# Patient Record
Sex: MALE | Race: WHITE | NOT HISPANIC OR LATINO | Employment: OTHER | URBAN - METROPOLITAN AREA
[De-identification: names, ages, dates, MRNs, and addresses within clinical notes are randomized per-mention and may not be internally consistent; named-entity substitution may affect disease eponyms.]

---

## 2024-07-08 ENCOUNTER — HOSPITAL ENCOUNTER (OUTPATIENT)
Facility: HOSPITAL | Age: 74
Setting detail: OBSERVATION
Discharge: HOME | End: 2024-07-10
Attending: INTERNAL MEDICINE | Admitting: INTERNAL MEDICINE
Payer: MEDICARE

## 2024-07-08 LAB
ALBUMIN SERPL-MCNC: 3.6 G/DL (ref 3.5–5.2)
ALBUMIN/GLOB SERPL: 1.4 G/DL
ALP SERPL-CCNC: 115 U/L (ref 39–117)
ALT SERPL W P-5'-P-CCNC: 25 U/L (ref 1–41)
ANION GAP SERPL CALCULATED.3IONS-SCNC: 10 MMOL/L (ref 5–15)
AST SERPL-CCNC: 30 U/L (ref 1–40)
BASOPHILS # BLD AUTO: 0.05 10*3/MM3 (ref 0–0.2)
BASOPHILS NFR BLD AUTO: 0.5 % (ref 0–1.5)
BILIRUB SERPL-MCNC: 0.3 MG/DL (ref 0–1.2)
BUN SERPL-MCNC: 21 MG/DL (ref 8–23)
BUN/CREAT SERPL: 22.6 (ref 7–25)
CALCIUM SPEC-SCNC: 9.1 MG/DL (ref 8.6–10.5)
CHLORIDE SERPL-SCNC: 97 MMOL/L (ref 98–107)
CO2 SERPL-SCNC: 25 MMOL/L (ref 22–29)
CREAT SERPL-MCNC: 0.93 MG/DL (ref 0.76–1.27)
CRP SERPL-MCNC: 4.73 MG/DL (ref 0–0.5)
DEPRECATED RDW RBC AUTO: 47.1 FL (ref 37–54)
EGFRCR SERPLBLD CKD-EPI 2021: 87.2 ML/MIN/1.73
EOSINOPHIL # BLD AUTO: 0.13 10*3/MM3 (ref 0–0.4)
EOSINOPHIL NFR BLD AUTO: 1.4 % (ref 0.3–6.2)
ERYTHROCYTE [DISTWIDTH] IN BLOOD BY AUTOMATED COUNT: 15 % (ref 12.3–15.4)
ERYTHROCYTE [SEDIMENTATION RATE] IN BLOOD: 28 MM/HR (ref 0–20)
GLOBULIN UR ELPH-MCNC: 2.6 GM/DL
GLUCOSE SERPL-MCNC: 88 MG/DL (ref 65–99)
HCT VFR BLD AUTO: 28.3 % (ref 37.5–51)
HGB BLD-MCNC: 9.4 G/DL (ref 13–17.7)
IMM GRANULOCYTES # BLD AUTO: 0.07 10*3/MM3 (ref 0–0.05)
IMM GRANULOCYTES NFR BLD AUTO: 0.7 % (ref 0–0.5)
LYMPHOCYTES # BLD AUTO: 0.71 10*3/MM3 (ref 0.7–3.1)
LYMPHOCYTES NFR BLD AUTO: 7.6 % (ref 19.6–45.3)
MCH RBC QN AUTO: 30.5 PG (ref 26.6–33)
MCHC RBC AUTO-ENTMCNC: 33.2 G/DL (ref 31.5–35.7)
MCV RBC AUTO: 91.9 FL (ref 79–97)
MONOCYTES # BLD AUTO: 1.09 10*3/MM3 (ref 0.1–0.9)
MONOCYTES NFR BLD AUTO: 11.6 % (ref 5–12)
NEUTROPHILS NFR BLD AUTO: 7.35 10*3/MM3 (ref 1.7–7)
NEUTROPHILS NFR BLD AUTO: 78.2 % (ref 42.7–76)
NRBC BLD AUTO-RTO: 0 /100 WBC (ref 0–0.2)
PLATELET # BLD AUTO: 383 10*3/MM3 (ref 140–450)
PMV BLD AUTO: 8.5 FL (ref 6–12)
POTASSIUM SERPL-SCNC: 4 MMOL/L (ref 3.5–5.2)
PROCALCITONIN SERPL-MCNC: 0.04 NG/ML (ref 0–0.25)
PROT SERPL-MCNC: 6.2 G/DL (ref 6–8.5)
RBC # BLD AUTO: 3.08 10*6/MM3 (ref 4.14–5.8)
SODIUM SERPL-SCNC: 132 MMOL/L (ref 136–145)
WBC NRBC COR # BLD AUTO: 9.4 10*3/MM3 (ref 3.4–10.8)

## 2024-07-08 PROCEDURE — G0378 HOSPITAL OBSERVATION PER HR: HCPCS

## 2024-07-08 PROCEDURE — 85025 COMPLETE CBC W/AUTO DIFF WBC: CPT | Performed by: INTERNAL MEDICINE

## 2024-07-08 PROCEDURE — 85652 RBC SED RATE AUTOMATED: CPT | Performed by: INTERNAL MEDICINE

## 2024-07-08 PROCEDURE — 84145 PROCALCITONIN (PCT): CPT | Performed by: INTERNAL MEDICINE

## 2024-07-08 PROCEDURE — 80053 COMPREHEN METABOLIC PANEL: CPT | Performed by: INTERNAL MEDICINE

## 2024-07-08 PROCEDURE — 86140 C-REACTIVE PROTEIN: CPT | Performed by: INTERNAL MEDICINE

## 2024-07-08 RX ORDER — TRAMADOL HYDROCHLORIDE 50 MG/1
100 TABLET ORAL 3 TIMES DAILY PRN
Status: DISCONTINUED | OUTPATIENT
Start: 2024-07-08 | End: 2024-07-09

## 2024-07-08 RX ORDER — NAPROXEN 500 MG/1
500 TABLET ORAL 2 TIMES DAILY WITH MEALS
Status: DISCONTINUED | OUTPATIENT
Start: 2024-07-09 | End: 2024-07-09

## 2024-07-08 RX ORDER — HYDROCODONE BITARTRATE AND ACETAMINOPHEN 5; 325 MG/1; MG/1
1 TABLET ORAL NIGHTLY
Status: DISCONTINUED | OUTPATIENT
Start: 2024-07-08 | End: 2024-07-09

## 2024-07-08 RX ADMIN — HYDROCODONE BITARTRATE AND ACETAMINOPHEN 1 TABLET: 5; 325 TABLET ORAL at 21:51

## 2024-07-09 ENCOUNTER — APPOINTMENT (OUTPATIENT)
Dept: ULTRASOUND IMAGING | Facility: HOSPITAL | Age: 74
End: 2024-07-09
Payer: MEDICARE

## 2024-07-09 PROBLEM — L03.90 CELLULITIS, UNSPECIFIED CELLULITIS SITE: Status: ACTIVE | Noted: 2024-07-09

## 2024-07-09 LAB
ALPHA-FETOPROTEIN: 4.26 NG/ML (ref 0–8.3)
BILIRUB UR QL STRIP: NEGATIVE
CANCER AG19-9 SERPL-ACNC: 23.6 U/ML
CEA SERPL-MCNC: 2.95 NG/ML
CLARITY UR: CLEAR
COLOR UR: YELLOW
GLUCOSE UR STRIP-MCNC: NEGATIVE MG/DL
HGB UR QL STRIP.AUTO: NEGATIVE
KETONES UR QL STRIP: NEGATIVE
LEUKOCYTE ESTERASE UR QL STRIP.AUTO: NEGATIVE
NITRITE UR QL STRIP: NEGATIVE
PH UR STRIP.AUTO: 5.5 [PH] (ref 5–8)
PROT UR QL STRIP: NEGATIVE
SP GR UR STRIP: 1.01 (ref 1–1.03)
UROBILINOGEN UR QL STRIP: NORMAL

## 2024-07-09 PROCEDURE — 76882 US LMTD JT/FCL EVL NVASC XTR: CPT

## 2024-07-09 PROCEDURE — 86301 IMMUNOASSAY TUMOR CA 19-9: CPT | Performed by: INTERNAL MEDICINE

## 2024-07-09 PROCEDURE — 82378 CARCINOEMBRYONIC ANTIGEN: CPT | Performed by: INTERNAL MEDICINE

## 2024-07-09 PROCEDURE — 81003 URINALYSIS AUTO W/O SCOPE: CPT | Performed by: INTERNAL MEDICINE

## 2024-07-09 PROCEDURE — 25010000002 CEFTRIAXONE PER 250 MG: Performed by: INTERNAL MEDICINE

## 2024-07-09 PROCEDURE — G0378 HOSPITAL OBSERVATION PER HR: HCPCS

## 2024-07-09 PROCEDURE — 82105 ALPHA-FETOPROTEIN SERUM: CPT | Performed by: INTERNAL MEDICINE

## 2024-07-09 RX ORDER — ROSUVASTATIN CALCIUM 10 MG/1
10 TABLET, COATED ORAL NIGHTLY
Status: DISCONTINUED | OUTPATIENT
Start: 2024-07-09 | End: 2024-07-10 | Stop reason: HOSPADM

## 2024-07-09 RX ORDER — DIPHENOXYLATE HYDROCHLORIDE AND ATROPINE SULFATE 2.5; .025 MG/1; MG/1
1 TABLET ORAL DAILY
Status: DISCONTINUED | OUTPATIENT
Start: 2024-07-09 | End: 2024-07-10 | Stop reason: HOSPADM

## 2024-07-09 RX ORDER — ASPIRIN 81 MG/1
81 TABLET ORAL DAILY
Status: DISCONTINUED | OUTPATIENT
Start: 2024-07-09 | End: 2024-07-10 | Stop reason: HOSPADM

## 2024-07-09 RX ORDER — FOLIC ACID 1 MG/1
1 TABLET ORAL DAILY
Status: DISCONTINUED | OUTPATIENT
Start: 2024-07-09 | End: 2024-07-10 | Stop reason: HOSPADM

## 2024-07-09 RX ORDER — HYDROCHLOROTHIAZIDE 12.5 MG/1
12.5 TABLET ORAL
Status: DISCONTINUED | OUTPATIENT
Start: 2024-07-09 | End: 2024-07-10 | Stop reason: HOSPADM

## 2024-07-09 RX ORDER — LISINOPRIL 20 MG/1
20 TABLET ORAL
Status: DISCONTINUED | OUTPATIENT
Start: 2024-07-09 | End: 2024-07-10 | Stop reason: HOSPADM

## 2024-07-09 RX ORDER — HYDROCODONE BITARTRATE AND ACETAMINOPHEN 5; 325 MG/1; MG/1
1 TABLET ORAL NIGHTLY
Status: DISCONTINUED | OUTPATIENT
Start: 2024-07-09 | End: 2024-07-10 | Stop reason: HOSPADM

## 2024-07-09 RX ORDER — TRAMADOL HYDROCHLORIDE 50 MG/1
100 TABLET ORAL 3 TIMES DAILY
Status: DISCONTINUED | OUTPATIENT
Start: 2024-07-09 | End: 2024-07-10 | Stop reason: HOSPADM

## 2024-07-09 RX ORDER — GABAPENTIN 300 MG/1
300 CAPSULE ORAL 3 TIMES DAILY
Status: DISCONTINUED | OUTPATIENT
Start: 2024-07-09 | End: 2024-07-10 | Stop reason: HOSPADM

## 2024-07-09 RX ORDER — METHOTREXATE 2.5 MG/1
15 TABLET ORAL
Status: DISCONTINUED | OUTPATIENT
Start: 2024-07-10 | End: 2024-07-10 | Stop reason: HOSPADM

## 2024-07-09 RX ADMIN — TRAMADOL HYDROCHLORIDE 100 MG: 50 TABLET ORAL at 17:03

## 2024-07-09 RX ADMIN — ROSUVASTATIN CALCIUM 10 MG: 10 TABLET, FILM COATED ORAL at 20:40

## 2024-07-09 RX ADMIN — TRAMADOL HYDROCHLORIDE 100 MG: 50 TABLET ORAL at 20:38

## 2024-07-09 RX ADMIN — HYDROCHLOROTHIAZIDE 12.5 MG: 12.5 TABLET ORAL at 18:17

## 2024-07-09 RX ADMIN — HYDROCODONE BITARTRATE AND ACETAMINOPHEN 1 TABLET: 5; 325 TABLET ORAL at 20:38

## 2024-07-09 RX ADMIN — CEFTRIAXONE SODIUM 1000 MG: 1 INJECTION, POWDER, FOR SOLUTION INTRAMUSCULAR; INTRAVENOUS at 00:16

## 2024-07-09 RX ADMIN — ASPIRIN 81 MG: 81 TABLET, COATED ORAL at 18:17

## 2024-07-09 RX ADMIN — NAPROXEN 500 MG: 500 TABLET ORAL at 07:19

## 2024-07-09 RX ADMIN — GABAPENTIN 300 MG: 300 CAPSULE ORAL at 20:38

## 2024-07-09 RX ADMIN — TRAMADOL HYDROCHLORIDE 100 MG: 50 TABLET ORAL at 08:44

## 2024-07-09 RX ADMIN — LISINOPRIL 20 MG: 20 TABLET ORAL at 18:17

## 2024-07-09 RX ADMIN — FOLIC ACID 1 MG: 1 TABLET ORAL at 18:17

## 2024-07-09 RX ADMIN — TRAMADOL HYDROCHLORIDE 100 MG: 50 TABLET ORAL at 00:24

## 2024-07-09 RX ADMIN — Medication 1 TABLET: at 18:17

## 2024-07-09 RX ADMIN — NAPROXEN 500 MG: 500 TABLET ORAL at 17:03

## 2024-07-09 NOTE — PLAN OF CARE
Goal Outcome Evaluation:  Plan of Care Reviewed With: (P) patient           Outcome Evaluation: (P) Pt admitted for ongoing R arm cellulitis. Dr. Farmer ordered scheduled tramadol and Norco 5 to help manage pain and to keep arm elevated. Pt vitals WDL and last BM 7/6/24. Hx of R BKA, MRSA, HTN, [place], and abnormal ECG. Pt is on heart healthy diet. Dr. Farmer plans to revaluate tomorrow.

## 2024-07-09 NOTE — PLAN OF CARE
Goal Outcome Evaluation:  Plan of Care Reviewed With: patient        Progress: no change  Outcome Evaluation: VSS, alert and oriented x4, pain controlled with prn pain medication, RA, right BKA, voids per urinal, 1g of Rochephin, diclofenac is non-formulary so Naproxen was the alternate medication, US order but they were busy will be done this morning, no issues to report.

## 2024-07-09 NOTE — H&P
[facility]   HISTORY AND PHYSICAL    Patient Name: Latrell Canela  : 1950  MRN: [id number]  Primary Care Physician:  Nita Farmer MD  Date of admission: 2024    Subjective   Subjective     Chief Complaint:  Redness pain and swelling of right forearm x 10 days     History of Present Illness  Patient had developed redness with swelling and pain of his right fore arm about 10 days back.   There is no history of injury or insect bite. No history of fever or chills.   He had treid oral antibiotics with no improvement of the swelling or redness. The redness and pain had worsened the last 3 days . Hence he was admitted for IV antibiotic therapy .   Review of Systems   Constitutional:  Positive for activity change, appetite change, fatigue and unexpected weight change. Negative for chills, diaphoresis and fever.   HENT: Negative.     Eyes: Negative.    Respiratory: Negative.     Cardiovascular: Negative.    Gastrointestinal: Negative.    Endocrine: Negative.    Genitourinary: Negative.    Musculoskeletal:  Positive for back pain, gait problem and myalgias.   Skin:  Negative for pallor, rash and wound.   Allergic/Immunologic: Positive for immunocompromised state.   Neurological:  Positive for weakness and numbness.   Hematological: Negative.    Psychiatric/Behavioral: Negative.          Personal History     Past Medical History:   Diagnosis Date    Abnormal ECG     Hyperlipidemia     Hypertension        Past Surgical History:   Procedure Laterality Date    AMPUTATION         Family History: family history includes Heart disease in his mother. Otherwise pertinent FHx was reviewed and not pertinent to current issue.    Social History:  reports that he has quit smoking. His smoking use included cigarettes. He has never used smokeless tobacco. He reports that he does not drink alcohol and does not use drugs.    Home Medications:  HYDROcodone-acetaminophen, Iron, Tofacitinib Citrate ER, aspirin, folic acid,  gabapentin, lisinopril-hydrochlorothiazide, methotrexate, multivitamin, oxyCODONE-acetaminophen, pneumococcal conj. 13-valent, rosuvastatin, and traMADol HCl    Allergies:  No Known Allergies    Objective    Objective     Vitals:   Temp:  [97.9 °F (36.6 °C)-98.6 °F (37 °C)] 97.9 °F (36.6 °C)  Heart Rate:  [] 66  Resp:  [16] 16  BP: (116-125)/(66-84) 116/66    Physical Exam  Constitutional:       Appearance: Normal appearance.   HENT:      Head: Normocephalic and atraumatic.      Right Ear: Tympanic membrane normal.      Left Ear: Tympanic membrane normal.      Nose: Nose normal.      Mouth/Throat:      Mouth: Mucous membranes are moist.   Eyes:      Conjunctiva/sclera: Conjunctivae normal.      Pupils: Pupils are equal, round, and reactive to light.   Cardiovascular:      Rate and Rhythm: Normal rate and regular rhythm.      Pulses: Normal pulses.      Heart sounds: Normal heart sounds.   Pulmonary:      Effort: Pulmonary effort is normal.      Breath sounds: Normal breath sounds.   Abdominal:      General: Abdomen is flat. Bowel sounds are normal.      Palpations: Abdomen is soft.   Musculoskeletal:      Cervical back: Normal range of motion and neck supple.      Comments: Rt fore arm with redness and swelling extending over the extensor aspect.     Rt BKA.   Skin:     Findings: Erythema present.   Neurological:      Mental Status: He is alert and oriented to person, place, and time.      Sensory: Sensory deficit present.      Motor: Weakness present.      Gait: Gait abnormal.      Comments: Motor weakness of LLE and decreased sensation in the lower third of the LLE.  Has RLE BKA with resultant gait issue. Has lower extremity prosthesis.    Psychiatric:         Mood and Affect: Mood normal.         Behavior: Behavior normal.         Thought Content: Thought content normal.         Judgment: Judgment normal.         Result Review    Result Review:  I have personally reviewed the results from the time of this  admission to 7/9/2024 17:22 EDT and agree with these findings:  [x]  Laboratory list / accordion  []  Microbiology  [x]  Radiology  []  EKG/Telemetry   []  Cardiology/Vascular   []  Pathology  []  Old records  []  Other:  Most notable findings include:     C-Reactive Protein 4.73 High  mg/dL        Sed Rate 28 High        Procalcitonin 0.04 ng/mL        WBC 9.40 10*3/mm3 Lymphocyte % 7.6 Low  %   RBC 3.08 Low  10*6/mm3 Monocyte % 11.6 %   Hemoglobin 9.4 Low  g/dL Eosinophil % 1.4 %   Hematocrit 28.3 Low  % Basophil % 0.5 %   MCV 91.9 fL Immature Grans % 0.7 High  %   MCH 30.5 pg Neutrophils, Absolute 7.35 High  10*3/mm3   MCHC 33.2 g/dL Lymphocytes, Absolute 0.71 10*3/mm3   RDW 15.0 % Monocytes, Absolute 1.09 High  10*3/mm3   RDW-SD 47.1 fl Eosinophils, Absolute 0.13 10*3/mm3   MPV 8.5 fL Basophils, Absolute 0.05 10*3/mm3   Platelets 383 10*3/mm3 Immature Grans, Absolute 0.07 High  10*3/mm3   Neutrophil % 78.2 High  % nRBC 0.0 /100 WBC     RUE Sonography was performed at the site of clinical interest in   the right forearm at the area of redness and swelling. The subcutaneous   fat appears edematous consistent with cellulitis though there is no   evidence for fluid collection or abscess.       Assessment & Plan   Assessment / Plan     Brief Patient Summary:  Latrell Canela is a 74 y.o. male who was admitted with chief complaints of redness and swelling with increasing pain the right forearm the last 10-12 days. He has no history of fall or blunt injury. He had no associated fever or chills. No history of insect bite to the area. He was treated as out patient with antibiotics . There was no improvement of the redness or swelling. Hence he was admitted for further evaluation and IV antibiotics therapy.   He has history of rheumatoid arthritis for which he is being treated with immune suppressants and being followed by the rheumatologst. .  He had been losing weight recently with severe fatigue. This is being evaluated  as out patient in office.  He has history of lower extremity infection in the past for which he needed RLE BKA about 15 years back. He uses RLE prosthesis for ambulation. He also has LLE motor weakness and sensory deficit of lateral aspect of LLE lower third of the leg.   Has been treated for hypertension and hypercholesterolemia that is in control with medication     Active Hospital Problems:  Active Hospital Problems    Diagnosis     **Cellulitis, unspecified cellulitis site      Plan:     1 .Cellulitis of RLE : with no improvement with oral antibiotics. Had been admitted to evaluate and be managed with IV antibiotics .  2.Fatigue : worsening fatigue with weight loss : This is being worked up as out patient.   3.Rheumatoid Arthritis : Ongoing treatment with rheumatologist.  4. Hypertension : On medications . With good control.  5. Hypercholesterolemia : On treatment with statin. In good control.     VTE Prophylaxis:  No VTE prophylaxis order currently exists.        CODE STATUS:       Admission Status:  I believe this patient meets Observation status.     Nita Farmer MD

## 2024-07-10 VITALS
OXYGEN SATURATION: 91 % | RESPIRATION RATE: 16 BRPM | HEART RATE: 93 BPM | HEIGHT: 76 IN | DIASTOLIC BLOOD PRESSURE: 67 MMHG | SYSTOLIC BLOOD PRESSURE: 111 MMHG | TEMPERATURE: 98.3 F | BODY MASS INDEX: 24.24 KG/M2 | WEIGHT: 199.08 LBS

## 2024-07-10 PROBLEM — L03.90 CELLULITIS, UNSPECIFIED CELLULITIS SITE: Status: RESOLVED | Noted: 2024-07-09 | Resolved: 2024-07-10

## 2024-07-10 LAB
IRON 24H UR-MRATE: 49 MCG/DL (ref 59–158)
IRON SATN MFR SERPL: 18 % (ref 20–50)
TIBC SERPL-MCNC: 279 MCG/DL (ref 298–536)
TRANSFERRIN SERPL-MCNC: 187 MG/DL (ref 200–360)

## 2024-07-10 PROCEDURE — G0378 HOSPITAL OBSERVATION PER HR: HCPCS

## 2024-07-10 PROCEDURE — G0009 ADMIN PNEUMOCOCCAL VACCINE: HCPCS | Performed by: INTERNAL MEDICINE

## 2024-07-10 PROCEDURE — 25010000002 CEFTRIAXONE PER 250 MG: Performed by: INTERNAL MEDICINE

## 2024-07-10 PROCEDURE — 83540 ASSAY OF IRON: CPT | Performed by: INTERNAL MEDICINE

## 2024-07-10 PROCEDURE — 84466 ASSAY OF TRANSFERRIN: CPT | Performed by: INTERNAL MEDICINE

## 2024-07-10 PROCEDURE — 63710000001 METHOTREXATE 2.5 MG TABLET: Performed by: INTERNAL MEDICINE

## 2024-07-10 PROCEDURE — 90670 PCV13 VACCINE IM: CPT | Performed by: INTERNAL MEDICINE

## 2024-07-10 PROCEDURE — 25010000002 PNEUMOCOCCAL CONJ. 13-VALENT SUSPENSION: Performed by: INTERNAL MEDICINE

## 2024-07-10 RX ORDER — CEFUROXIME AXETIL 250 MG/1
500 TABLET ORAL EVERY 12 HOURS SCHEDULED
Status: DISCONTINUED | OUTPATIENT
Start: 2024-07-10 | End: 2024-07-10 | Stop reason: HOSPADM

## 2024-07-10 RX ORDER — CEFUROXIME AXETIL 500 MG/1
500 TABLET ORAL EVERY 12 HOURS SCHEDULED
Qty: 14 TABLET | Refills: 0 | Status: SHIPPED | OUTPATIENT
Start: 2024-07-10 | End: 2024-07-17

## 2024-07-10 RX ADMIN — GABAPENTIN 300 MG: 300 CAPSULE ORAL at 08:33

## 2024-07-10 RX ADMIN — Medication 1 TABLET: at 08:33

## 2024-07-10 RX ADMIN — LISINOPRIL 20 MG: 20 TABLET ORAL at 08:33

## 2024-07-10 RX ADMIN — CEFUROXIME AXETIL 500 MG: 250 TABLET ORAL at 18:31

## 2024-07-10 RX ADMIN — CEFTRIAXONE 2000 MG: 2 INJECTION, POWDER, FOR SOLUTION INTRAMUSCULAR; INTRAVENOUS at 00:30

## 2024-07-10 RX ADMIN — PNEUMOCOCCAL 13-VALENT CONJUGATE VACCINE 0.5 ML: 2.2; 2.2; 2.2; 2.2; 2.2; 4.4; 2.2; 2.2; 2.2; 2.2; 2.2; 2.2; 2.2 INJECTION, SUSPENSION INTRAMUSCULAR at 12:08

## 2024-07-10 RX ADMIN — TRAMADOL HYDROCHLORIDE 100 MG: 50 TABLET ORAL at 08:33

## 2024-07-10 RX ADMIN — GABAPENTIN 300 MG: 300 CAPSULE ORAL at 14:02

## 2024-07-10 RX ADMIN — HYDROCHLOROTHIAZIDE 12.5 MG: 12.5 TABLET ORAL at 08:33

## 2024-07-10 RX ADMIN — ASPIRIN 81 MG: 81 TABLET, COATED ORAL at 08:33

## 2024-07-10 RX ADMIN — TRAMADOL HYDROCHLORIDE 100 MG: 50 TABLET ORAL at 14:02

## 2024-07-10 RX ADMIN — METHOTREXATE 15 MG: 2.5 TABLET ORAL at 08:33

## 2024-07-10 NOTE — CASE MANAGEMENT/SOCIAL WORK
Discharge Planning Assessment  [facility]     Patient Name: Latrell Canela  MRN: [id number]  Today's Date: 7/10/2024    Admit Date: 7/8/2024    Plan: Home with friend support.   Discharge Needs Assessment       Row Name 07/10/24 1714       Living Environment    People in Home alone    Current Living Arrangements home    Primary Care Provided by self    Provides Primary Care For no one    Family Caregiver if Needed friend(s)    Able to Return to Prior Arrangements yes       Resource/Environmental Concerns    Transportation Concerns none       Transition Planning    Patient/Family Anticipates Transition to home    Patient/Family Anticipated Services at Transition     Transportation Anticipated family or friend will provide       Discharge Needs Assessment    Readmission Within the Last 30 Days no previous admission in last 30 days    Equipment Currently Used at Home prosthesis                   Discharge Plan       Row Name 07/10/24 1714       Plan    Plan Home with friend support.    Patient/Family in Agreement with Plan yes    Plan Comments Spoke with the patient, verified current information and explained the role of the CCP. Patient said he lives alone and has friend support. He's IADL and uses a prosthesis. He has no history with RH/HH. Patient plans to discharge home with family support. He also plans for his family/friends to transport him home at discharge. No other needs identified. CCP will follow.                  Continued Care and Services - Admitted Since 7/8/2024    No active coordination exists for this encounter.          Demographic Summary       Row Name 07/10/24 1713       General Information    Admission Type observation    Reason for Consult discharge planning    Preferred Language English       Contact Information    Permission Granted to Share Info With ;family/designee                   Functional Status       Row Name 07/10/24 1713       Functional Status    Usual  Activity Tolerance good       Functional Status, IADL    Medications independent    Meal Preparation independent    Housekeeping independent    Laundry independent    Shopping independent       Mental Status    General Appearance WDL WDL       Mental Status Summary    Recent Changes in Mental Status/Cognitive Functioning no changes                   Psychosocial       Row Name 07/10/24 1713       Intellectual Performance WDL    Level of Consciousness Alert       Coping/Stress    Patient Personal Strengths able to adapt    Sources of Support friend(s)    Reaction to Health Status accepting    Understanding of Condition and Treatment adequate understanding of medical condition;adequate understanding of treatment       Developmental Stage (Eriksson's)    Developmental Stage Stage 8 (65 years-death/Late Adulthood) Integrity vs. Marzena BUTLER, RN

## 2024-07-10 NOTE — DISCHARGE SUMMARY
Latrell Canela   Male, 74 y.o., 1950  MRN: [id number]        Admit Date : 7/8/2024   Discharge date : 7/10/2024    Admitting Physician :   Nita colorado MD    Tests Done :     US of RUE    Discharge Diagnosis :    Cellulitis Of Rt Forearm .  Fatigue  Anemia  Hypertension  Hypercholesterolemia   Rheumatoid arthritis  Neuropathy of LE   RLE - BKA with prosthetic limb       Brief discussion of hospital stay :     Latrell Canela is a 74 y.o. male who was admitted with chief complaints of redness and swelling with increasing pain the right forearm the last 10-12 days. He has no history of fall or blunt injury. He had no associated fever or chills. No history of insect bite to the area. He was treated as out patient with antibiotics . There was no improvement of the redness or swelling. Hence he was admitted for further evaluation and IV antibiotics therapy.   He has history of rheumatoid arthritis for which he is being treated with immune suppressants and being followed by the rheumatologst. .  He had been losing weight recently with severe fatigue. This is being evaluated as out patient in office.  He has history of lower extremity infection in the past for which he needed RLE BKA about 15 years back. He uses RLE prosthesis for ambulation. He also has LLE motor weakness and sensory deficit of lateral aspect of LLE lower third of the leg.   Has been treated for hypertension and hypercholesterolemia that is in control with medication.  Patient was showing improvement with IV antibiotics. The swelling and pain was getting better. He was given IV Rocephin 2 gm, q24h x 2 doses. US of RT forearm did not show any evidence of fluid collection or abscess. Hence he was discharged home with oral antibiotics cefuroxime 500 mg BID x 7 days.   Patient has weight loss and fatigue the last couple of months.   Labs have been normal and at his baseline.   Showing Iron deficiency for which he is on Iron supplements already. There is  some drop in H&H. But had repeat colonoscopy recently.  Re evaluate with upper GI if needed. Referral to his gastroenterologist as out patient.    He will continue all his preadmission medications on discharge.     Discharge medications :      Cefuroxime 500 mg BID x 7 days   HYDROcodone-acetaminophen 5/325 oral q hs   Tramadol 100 mg oral TID   Aspirin 81 mg oral daily   Rosuvastatin 10 mg , oral qhs   Lisinopril-HCTZ 20/12.5 oral daily   Folic Acid 1 mg oral daily   Gabapentin 300 mg , oral TID     Per Rheumatologist recommendations ;   Tofacitinib Citrate ER  methotrexate,     Discharge Examination :     Constitutional:    Staying afebrile      Appearance: Normal appearance.   HENT:      Head: Normocephalic and atraumatic.      Right Ear: Tympanic membrane normal.      Left Ear: Tympanic membrane normal.      Nose: Nose normal.      Mouth/Throat:      Mouth: Mucous membranes are moist.   Eyes:      Conjunctiva/sclera: Conjunctivae normal.      Pupils: Pupils are equal, round, and reactive to light.   Cardiovascular:      Rate and Rhythm: Normal rate and regular rhythm.      Pulses: Normal pulses.      Heart sounds: Normal heart sounds.   Pulmonary:      Effort: Pulmonary effort is normal.      Breath sounds: Normal breath sounds.   Abdominal:      General: Abdomen is flat. Bowel sounds are normal.      Palpations: Abdomen is soft.   Musculoskeletal:      Cervical back: Normal range of motion and neck supple.      Comments: Rt fore arm with redness and swelling extending over the extensor aspect - improving      Rt BKA.   Skin:     Findings: Erythema present.   Neurological:      Mental Status: He is alert and oriented to person, place, and time.      Sensory: Sensory deficit present.      Motor: Weakness present.      Gait: Gait abnormal.      Comments: Motor weakness of LLE and decreased sensation in the lower third of the LLE.  Has RLE BKA with resultant gait issue. Has lower extremity prosthesis.     Psychiatric:         Mood and Affect: Mood normal.         Behavior: Behavior normal.         Thought Content: Thought content normal.         Judgment: Judgment normal.     Discharge Labs : reviewed.     Discharge recommendations :   Discharged home with recommendation to complete oral antibiotics x 7 days.   Patient will continue his preadmission activities and medications .  Follow up with Rheumatologist as usual per their recommendation.   Follow up with me in 7-10 days.

## 2024-07-10 NOTE — PLAN OF CARE
Goal Outcome Evaluation:      Patient ambulating ad dre, VSS and voiding function intact. Patient with minimal pain, managed well with prn po meds. Patient d/c on oral abx, first dose given this evening prior to d/c.

## 2024-07-10 NOTE — PLAN OF CARE
Goal Outcome Evaluation:  Plan of Care Reviewed With: patient        Progress: improving  Outcome Evaluation: VSS, alert and oriented x4, RA, ub ad dre, pain controlled with prn pain medication, elevated right arm all night, no issues to report.

## 2024-07-11 NOTE — CASE MANAGEMENT/SOCIAL WORK
Case Management Discharge Note      Final Note: dc home         Selected Continued Care - Discharged on 7/10/2024 Admission date: 7/8/2024 - Discharge disposition: Home or Self Care      Destination    No services have been selected for the patient.                Durable Medical Equipment    No services have been selected for the patient.                Dialysis/Infusion    No services have been selected for the patient.                Home Medical Care    No services have been selected for the patient.                Therapy    No services have been selected for the patient.                Community Resources    No services have been selected for the patient.                Community & DME    No services have been selected for the patient.                    Transportation Services  Private: Car    Final Discharge Disposition Code: 01 - home or self-care

## 2024-07-25 ENCOUNTER — OFFICE VISIT (OUTPATIENT)
Dept: WOUND CARE | Facility: HOSPITAL | Age: 74
End: 2024-07-25
Payer: MEDICARE

## 2024-07-25 PROCEDURE — G0463 HOSPITAL OUTPT CLINIC VISIT: HCPCS

## 2024-07-25 PROCEDURE — 97602 WOUND(S) CARE NON-SELECTIVE: CPT

## 2024-07-26 ENCOUNTER — TRANSCRIBE ORDERS (OUTPATIENT)
Dept: ADMINISTRATIVE | Facility: HOSPITAL | Age: 74
End: 2024-07-26
Payer: MEDICARE

## 2024-07-26 DIAGNOSIS — L03.113 CELLULITIS OF RIGHT UPPER LIMB: Primary | ICD-10-CM

## 2024-07-27 ENCOUNTER — APPOINTMENT (OUTPATIENT)
Dept: MRI IMAGING | Facility: HOSPITAL | Age: 74
End: 2024-07-27
Payer: MEDICARE

## 2024-07-27 ENCOUNTER — HOSPITAL ENCOUNTER (INPATIENT)
Facility: HOSPITAL | Age: 74
LOS: 2 days | Discharge: HOME | End: 2024-07-30
Attending: EMERGENCY MEDICINE | Admitting: INTERNAL MEDICINE
Payer: MEDICARE

## 2024-07-27 ENCOUNTER — APPOINTMENT (OUTPATIENT)
Dept: CT IMAGING | Facility: HOSPITAL | Age: 74
End: 2024-07-27
Payer: MEDICARE

## 2024-07-27 ENCOUNTER — INPATIENT HOSPITAL (OUTPATIENT)
Dept: URBAN - METROPOLITAN AREA HOSPITAL 113 | Facility: HOSPITAL | Age: 74
End: 2024-07-27
Payer: MEDICARE

## 2024-07-27 ENCOUNTER — APPOINTMENT (OUTPATIENT)
Dept: GENERAL RADIOLOGY | Facility: HOSPITAL | Age: 74
End: 2024-07-27
Payer: MEDICARE

## 2024-07-27 DIAGNOSIS — D63.8 ANEMIA IN OTHER CHRONIC DISEASES CLASSIFIED ELSEWHERE: ICD-10-CM

## 2024-07-27 DIAGNOSIS — R11.0 NAUSEA: ICD-10-CM

## 2024-07-27 DIAGNOSIS — D64.9 ANEMIA, UNSPECIFIED TYPE: ICD-10-CM

## 2024-07-27 DIAGNOSIS — R42 DIZZINESS: Primary | ICD-10-CM

## 2024-07-27 LAB
ALBUMIN SERPL-MCNC: 3.6 G/DL (ref 3.5–5.2)
ALBUMIN/GLOB SERPL: 1.3 G/DL
ALP SERPL-CCNC: 116 U/L (ref 39–117)
ALT SERPL W P-5'-P-CCNC: 52 U/L (ref 1–41)
ANION GAP SERPL CALCULATED.3IONS-SCNC: 15 MMOL/L (ref 5–15)
APTT PPP: 27.7 SECONDS (ref 22.7–35.4)
AST SERPL-CCNC: 52 U/L (ref 1–40)
BASOPHILS # BLD AUTO: 0.04 10*3/MM3 (ref 0–0.2)
BASOPHILS NFR BLD AUTO: 0.4 % (ref 0–1.5)
BILIRUB SERPL-MCNC: 0.5 MG/DL (ref 0–1.2)
BILIRUB UR QL STRIP: NEGATIVE
BUN SERPL-MCNC: 20 MG/DL (ref 8–23)
BUN/CREAT SERPL: 23.8 (ref 7–25)
CALCIUM SPEC-SCNC: 9.1 MG/DL (ref 8.6–10.5)
CHLORIDE SERPL-SCNC: 93 MMOL/L (ref 98–107)
CLARITY UR: CLEAR
CO2 SERPL-SCNC: 24 MMOL/L (ref 22–29)
COLOR UR: YELLOW
CREAT SERPL-MCNC: 0.84 MG/DL (ref 0.76–1.27)
DEPRECATED RDW RBC AUTO: 49.2 FL (ref 37–54)
EGFRCR SERPLBLD CKD-EPI 2021: 92.7 ML/MIN/1.73
EOSINOPHIL # BLD AUTO: 0.06 10*3/MM3 (ref 0–0.4)
EOSINOPHIL NFR BLD AUTO: 0.7 % (ref 0.3–6.2)
ERYTHROCYTE [DISTWIDTH] IN BLOOD BY AUTOMATED COUNT: 15.8 % (ref 12.3–15.4)
FERRITIN SERPL-MCNC: 611 NG/ML (ref 30–400)
GEN 5 2HR TROPONIN T REFLEX: 33 NG/L
GLOBULIN UR ELPH-MCNC: 2.8 GM/DL
GLUCOSE SERPL-MCNC: 97 MG/DL (ref 65–99)
GLUCOSE UR STRIP-MCNC: NEGATIVE MG/DL
HAPTOGLOB SERPL-MCNC: 401 MG/DL (ref 30–200)
HCT VFR BLD AUTO: 26.2 % (ref 37.5–51)
HGB BLD-MCNC: 8.6 G/DL (ref 13–17.7)
HGB UR QL STRIP.AUTO: NEGATIVE
IMM GRANULOCYTES # BLD AUTO: 0.11 10*3/MM3 (ref 0–0.05)
IMM GRANULOCYTES NFR BLD AUTO: 1.2 % (ref 0–0.5)
INR PPP: 1.32 (ref 0.9–1.1)
KETONES UR QL STRIP: ABNORMAL
LDH SERPL-CCNC: 469 U/L (ref 135–225)
LEUKOCYTE ESTERASE UR QL STRIP.AUTO: NEGATIVE
LYMPHOCYTES # BLD AUTO: 0.77 10*3/MM3 (ref 0.7–3.1)
LYMPHOCYTES NFR BLD AUTO: 8.4 % (ref 19.6–45.3)
MAGNESIUM SERPL-MCNC: 1.9 MG/DL (ref 1.6–2.4)
MCH RBC QN AUTO: 29.8 PG (ref 26.6–33)
MCHC RBC AUTO-ENTMCNC: 32.8 G/DL (ref 31.5–35.7)
MCV RBC AUTO: 90.7 FL (ref 79–97)
MONOCYTES # BLD AUTO: 0.91 10*3/MM3 (ref 0.1–0.9)
MONOCYTES NFR BLD AUTO: 10 % (ref 5–12)
NEUTROPHILS NFR BLD AUTO: 7.24 10*3/MM3 (ref 1.7–7)
NEUTROPHILS NFR BLD AUTO: 79.3 % (ref 42.7–76)
NITRITE UR QL STRIP: NEGATIVE
NRBC BLD AUTO-RTO: 0 /100 WBC (ref 0–0.2)
NT-PROBNP SERPL-MCNC: 192 PG/ML (ref 0–900)
PH UR STRIP.AUTO: 7.5 [PH] (ref 5–8)
PLATELET # BLD AUTO: 702 10*3/MM3 (ref 140–450)
PMV BLD AUTO: 8.8 FL (ref 6–12)
POTASSIUM SERPL-SCNC: 3.6 MMOL/L (ref 3.5–5.2)
PROT SERPL-MCNC: 6.4 G/DL (ref 6–8.5)
PROT UR QL STRIP: NEGATIVE
PROTHROMBIN TIME: 16.6 SECONDS (ref 11.7–14.2)
RBC # BLD AUTO: 2.89 10*6/MM3 (ref 4.14–5.8)
RETICS # AUTO: 0.05 10*6/MM3 (ref 0.02–0.13)
RETICS/RBC NFR AUTO: 1.49 % (ref 0.7–1.9)
SODIUM SERPL-SCNC: 132 MMOL/L (ref 136–145)
SP GR UR STRIP: 1.02 (ref 1–1.03)
TROPONIN T DELTA: 2 NG/L
TROPONIN T SERPL HS-MCNC: 31 NG/L
TROPONIN T SERPL HS-MCNC: 39 NG/L
UROBILINOGEN UR QL STRIP: ABNORMAL
WBC NRBC COR # BLD AUTO: 9.13 10*3/MM3 (ref 3.4–10.8)

## 2024-07-27 PROCEDURE — 85045 AUTOMATED RETICULOCYTE COUNT: CPT | Performed by: INTERNAL MEDICINE

## 2024-07-27 PROCEDURE — 71045 X-RAY EXAM CHEST 1 VIEW: CPT

## 2024-07-27 PROCEDURE — 85730 THROMBOPLASTIN TIME PARTIAL: CPT | Performed by: NURSE PRACTITIONER

## 2024-07-27 PROCEDURE — 25810000003 SODIUM CHLORIDE 0.9 % SOLUTION: Performed by: INTERNAL MEDICINE

## 2024-07-27 PROCEDURE — 70450 CT HEAD/BRAIN W/O DYE: CPT

## 2024-07-27 PROCEDURE — 25010000002 VANCOMYCIN 10 G RECONSTITUTED SOLUTION: Performed by: INTERNAL MEDICINE

## 2024-07-27 PROCEDURE — 85610 PROTHROMBIN TIME: CPT | Performed by: NURSE PRACTITIONER

## 2024-07-27 PROCEDURE — 36415 COLL VENOUS BLD VENIPUNCTURE: CPT

## 2024-07-27 PROCEDURE — 73218 MRI UPPER EXTREMITY W/O DYE: CPT

## 2024-07-27 PROCEDURE — 83615 LACTATE (LD) (LDH) ENZYME: CPT | Performed by: INTERNAL MEDICINE

## 2024-07-27 PROCEDURE — 84484 ASSAY OF TROPONIN QUANT: CPT | Performed by: NURSE PRACTITIONER

## 2024-07-27 PROCEDURE — G0378 HOSPITAL OBSERVATION PER HR: HCPCS

## 2024-07-27 PROCEDURE — 99222 1ST HOSP IP/OBS MODERATE 55: CPT | Performed by: INTERNAL MEDICINE

## 2024-07-27 PROCEDURE — 82728 ASSAY OF FERRITIN: CPT | Performed by: INTERNAL MEDICINE

## 2024-07-27 PROCEDURE — 80053 COMPREHEN METABOLIC PANEL: CPT | Performed by: NURSE PRACTITIONER

## 2024-07-27 PROCEDURE — 84484 ASSAY OF TROPONIN QUANT: CPT | Performed by: INTERNAL MEDICINE

## 2024-07-27 PROCEDURE — 85025 COMPLETE CBC W/AUTO DIFF WBC: CPT | Performed by: NURSE PRACTITIONER

## 2024-07-27 PROCEDURE — 83010 ASSAY OF HAPTOGLOBIN QUANT: CPT | Performed by: INTERNAL MEDICINE

## 2024-07-27 PROCEDURE — 99285 EMERGENCY DEPT VISIT HI MDM: CPT

## 2024-07-27 PROCEDURE — 83735 ASSAY OF MAGNESIUM: CPT | Performed by: NURSE PRACTITIONER

## 2024-07-27 PROCEDURE — 25810000003 SODIUM CHLORIDE 0.9 % SOLUTION: Performed by: NURSE PRACTITIONER

## 2024-07-27 PROCEDURE — 81003 URINALYSIS AUTO W/O SCOPE: CPT | Performed by: NURSE PRACTITIONER

## 2024-07-27 PROCEDURE — 71275 CT ANGIOGRAPHY CHEST: CPT

## 2024-07-27 PROCEDURE — 25010000002 ONDANSETRON PER 1 MG: Performed by: NURSE PRACTITIONER

## 2024-07-27 PROCEDURE — 25510000001 IOPAMIDOL PER 1 ML: Performed by: EMERGENCY MEDICINE

## 2024-07-27 PROCEDURE — 83880 ASSAY OF NATRIURETIC PEPTIDE: CPT | Performed by: NURSE PRACTITIONER

## 2024-07-27 RX ORDER — FOLIC ACID 1 MG/1
1 TABLET ORAL DAILY
Status: DISCONTINUED | OUTPATIENT
Start: 2024-07-27 | End: 2024-07-30 | Stop reason: HOSPADM

## 2024-07-27 RX ORDER — DOXYCYCLINE HYCLATE 100 MG/1
100 CAPSULE ORAL 2 TIMES DAILY
COMMUNITY
End: 2024-07-30 | Stop reason: HOSPADM

## 2024-07-27 RX ORDER — GABAPENTIN 300 MG/1
300 CAPSULE ORAL 3 TIMES DAILY
Status: DISCONTINUED | OUTPATIENT
Start: 2024-07-27 | End: 2024-07-27

## 2024-07-27 RX ORDER — TRAMADOL HYDROCHLORIDE 50 MG/1
100 TABLET ORAL 3 TIMES DAILY
Status: DISCONTINUED | OUTPATIENT
Start: 2024-07-27 | End: 2024-07-30 | Stop reason: HOSPADM

## 2024-07-27 RX ORDER — POLYETHYLENE GLYCOL 3350 17 G/17G
17 POWDER, FOR SOLUTION ORAL DAILY
Status: DISCONTINUED | OUTPATIENT
Start: 2024-07-27 | End: 2024-07-30 | Stop reason: HOSPADM

## 2024-07-27 RX ORDER — LORAZEPAM 0.5 MG/1
0.5 TABLET ORAL ONCE
Status: COMPLETED | OUTPATIENT
Start: 2024-07-28 | End: 2024-07-27

## 2024-07-27 RX ORDER — FERROUS SULFATE 325(65) MG
325 TABLET ORAL
Status: DISCONTINUED | OUTPATIENT
Start: 2024-07-27 | End: 2024-07-30 | Stop reason: HOSPADM

## 2024-07-27 RX ORDER — ONDANSETRON 2 MG/ML
4 INJECTION INTRAMUSCULAR; INTRAVENOUS ONCE
Status: COMPLETED | OUTPATIENT
Start: 2024-07-27 | End: 2024-07-27

## 2024-07-27 RX ORDER — HYDROCODONE BITARTRATE AND ACETAMINOPHEN 5; 325 MG/1; MG/1
1 TABLET ORAL NIGHTLY
Status: DISCONTINUED | OUTPATIENT
Start: 2024-07-27 | End: 2024-07-30 | Stop reason: HOSPADM

## 2024-07-27 RX ORDER — GABAPENTIN 100 MG/1
100 CAPSULE ORAL 3 TIMES DAILY
Status: DISCONTINUED | OUTPATIENT
Start: 2024-07-27 | End: 2024-07-30 | Stop reason: HOSPADM

## 2024-07-27 RX ORDER — ASPIRIN 81 MG/1
81 TABLET, CHEWABLE ORAL DAILY
Status: DISCONTINUED | OUTPATIENT
Start: 2024-07-27 | End: 2024-07-30 | Stop reason: HOSPADM

## 2024-07-27 RX ORDER — DIPHENOXYLATE HYDROCHLORIDE AND ATROPINE SULFATE 2.5; .025 MG/1; MG/1
1 TABLET ORAL DAILY
Status: DISCONTINUED | OUTPATIENT
Start: 2024-07-27 | End: 2024-07-30 | Stop reason: HOSPADM

## 2024-07-27 RX ORDER — ROSUVASTATIN CALCIUM 10 MG/1
10 TABLET, COATED ORAL NIGHTLY
Status: DISCONTINUED | OUTPATIENT
Start: 2024-07-27 | End: 2024-07-30 | Stop reason: HOSPADM

## 2024-07-27 RX ORDER — BISACODYL 5 MG/1
10 TABLET, DELAYED RELEASE ORAL ONCE
Status: COMPLETED | OUTPATIENT
Start: 2024-07-27 | End: 2024-07-28

## 2024-07-27 RX ORDER — SODIUM CHLORIDE 0.9 % (FLUSH) 0.9 %
10 SYRINGE (ML) INJECTION AS NEEDED
Status: DISCONTINUED | OUTPATIENT
Start: 2024-07-27 | End: 2024-07-30 | Stop reason: HOSPADM

## 2024-07-27 RX ADMIN — GABAPENTIN 100 MG: 100 CAPSULE ORAL at 21:00

## 2024-07-27 RX ADMIN — LORAZEPAM 0.5 MG: 0.5 TABLET ORAL at 23:26

## 2024-07-27 RX ADMIN — FERROUS SULFATE TAB 325 MG (65 MG ELEMENTAL FE) 325 MG: 325 (65 FE) TAB at 21:00

## 2024-07-27 RX ADMIN — ROSUVASTATIN CALCIUM 10 MG: 10 TABLET, FILM COATED ORAL at 21:00

## 2024-07-27 RX ADMIN — HYDROCODONE BITARTRATE AND ACETAMINOPHEN 1 TABLET: 5; 325 TABLET ORAL at 22:10

## 2024-07-27 RX ADMIN — LISINOPRIL: 20 TABLET ORAL at 15:11

## 2024-07-27 RX ADMIN — FOLIC ACID 1 MG: 1 TABLET ORAL at 15:11

## 2024-07-27 RX ADMIN — Medication 10 ML: at 21:00

## 2024-07-27 RX ADMIN — Medication 1 TABLET: at 15:11

## 2024-07-27 RX ADMIN — GABAPENTIN 300 MG: 300 CAPSULE ORAL at 15:12

## 2024-07-27 RX ADMIN — SODIUM CHLORIDE 1000 ML: 9 INJECTION, SOLUTION INTRAVENOUS at 01:15

## 2024-07-27 RX ADMIN — POLYETHYLENE GLYCOL 3350 17 G: 17 POWDER, FOR SOLUTION ORAL at 22:09

## 2024-07-27 RX ADMIN — VANCOMYCIN HYDROCHLORIDE 1750 MG: 10 INJECTION, POWDER, LYOPHILIZED, FOR SOLUTION INTRAVENOUS at 22:09

## 2024-07-27 RX ADMIN — TRAMADOL HYDROCHLORIDE 100 MG: 50 TABLET ORAL at 15:11

## 2024-07-27 RX ADMIN — ONDANSETRON 4 MG: 2 INJECTION INTRAMUSCULAR; INTRAVENOUS at 01:44

## 2024-07-27 RX ADMIN — IOPAMIDOL 95 ML: 755 INJECTION, SOLUTION INTRAVENOUS at 02:57

## 2024-07-27 RX ADMIN — ASPIRIN 81 MG: 81 TABLET, CHEWABLE ORAL at 15:11

## 2024-07-27 NOTE — ED NOTES
".Nursing report ED to floor  Latrell Canela  74 y.o.  male    HPI :  HPI (Adult)  Stated Reason for Visit: Pt presents to ED from home with complaints of sudden onset of dizziness x1 hour that has been \"somewhat intermittent\". Pt denies double vision. Pt took doxycycline on empty stomach. Pt had 1 episode of vomiting.  History Obtained From: patient, EMS    Chief Complaint  Chief Complaint   Patient presents with    Dizziness       Admitting doctor:   Nita Farmer MD    Admitting diagnosis:   The primary encounter diagnosis was Dizziness. A diagnosis of Anemia, unspecified type was also pertinent to this visit.    Code status:   Current Code Status       Date Active Code Status Order ID Comments User Context       Not on file            Allergies:   Patient has no known allergies.    Isolation:   No active isolations    Intake and Output  No intake or output data in the 24 hours ending 07/27/24 0619    Weight:   There were no vitals filed for this visit.    Most recent vitals:   Vitals:    07/27/24 0104 07/27/24 0114 07/27/24 0313   BP: 141/74 135/75 131/78   BP Location: Right arm     Patient Position: Sitting     Pulse: 74 73 73   Resp: 20     Temp: 98.3 °F (36.8 °C)     TempSrc: Tympanic     SpO2: 99% 98% 100%       Active LDAs/IV Access:   Lines, Drains & Airways       Active LDAs       Name Placement date Placement time Site Days    Peripheral IV 07/27/24 0102 Anterior;Distal;Left;Upper Arm 07/27/24 0102  Arm  less than 1                    Labs (abnormal labs have a star):   Labs Reviewed   COMPREHENSIVE METABOLIC PANEL - Abnormal; Notable for the following components:       Result Value    Sodium 132 (*)     Chloride 93 (*)     ALT (SGPT) 52 (*)     AST (SGOT) 52 (*)     All other components within normal limits    Narrative:     GFR Normal >60  Chronic Kidney Disease <60  Kidney Failure <15    The GFR formula is only valid for adults with stable renal function between ages 18 and 70.   PROTIME-INR - " Abnormal; Notable for the following components:    Protime 16.6 (*)     INR 1.32 (*)     All other components within normal limits   SINGLE HS TROPONIN T - Abnormal; Notable for the following components:    HS Troponin T 39 (*)     All other components within normal limits    Narrative:     High Sensitive Troponin T Reference Range:  <14.0 ng/L- Negative Female for AMI  <22.0 ng/L- Negative Male for AMI  >=14 - Abnormal Female indicating possible myocardial injury.  >=22 - Abnormal Male indicating possible myocardial injury.   Clinicians would have to utilize clinical acumen, EKG, Troponin, and serial changes to determine if it is an Acute Myocardial Infarction or myocardial injury due to an underlying chronic condition.        CBC WITH AUTO DIFFERENTIAL - Abnormal; Notable for the following components:    RBC 2.89 (*)     Hemoglobin 8.6 (*)     Hematocrit 26.2 (*)     RDW 15.8 (*)     Platelets 702 (*)     Neutrophil % 79.3 (*)     Lymphocyte % 8.4 (*)     Immature Grans % 1.2 (*)     Neutrophils, Absolute 7.24 (*)     Monocytes, Absolute 0.91 (*)     Immature Grans, Absolute 0.11 (*)     All other components within normal limits   APTT - Normal   BNP (IN-HOUSE) - Normal    Narrative:     This assay is used as an aid in the diagnosis of individuals suspected of having heart failure. It can be used as an aid in the diagnosis of acute decompensated heart failure (ADHF) in patients presenting with signs and symptoms of ADHF to the emergency department (ED). In addition, NT-proBNP of <300 pg/mL indicates ADHF is not likely.    Age Range Result Interpretation  NT-proBNP Concentration (pg/mL:      <50             Positive            >450                   Gray                 300-450                    Negative             <300    50-75           Positive            >900                  Gray                300-900                  Negative            <300      >75             Positive            >1800                   Choud[place]ury                300-1800                  Negative            <300   MAGNESIUM - Normal   URINALYSIS W/ MICROSCOPIC IF INDICATED (NO CULTURE)   CBC AND DIFFERENTIAL    Narrative:     The following orders were created for panel order CBC & Differential.  Procedure                               Abnormality         Status                     ---------                               -----------         ------                     CBC Auto Differential[[id number]]        Abnormal            Final result                 Please view results for these tests on the individual orders.       EKG:   No orders to display       Meds given in ED:   Medications   sodium chloride 0.9 % flush 10 mL (has no administration in time range)   sodium chloride 0.9 % bolus 1,000 mL (0 mL Intravenous Stopped 7/27/24 0309)   ondansetron (ZOFRAN) injection 4 mg (4 mg Intravenous Given 7/27/24 0144)   iopamidol (ISOVUE-370) 76 % injection 100 mL (95 mL Intravenous Given 7/27/24 0257)       Imaging results:  CT Head Without Contrast    Result Date: 7/27/2024  Electronically signed by Speedy Cerrato MD on 07-27-24 at 0320    CT Angiogram Chest Pulmonary Embolism    Result Date: 7/27/2024  Electronically signed by Speedy Cerrato MD on 07-27-24 at 0316    XR Chest 1 View    Result Date: 7/27/2024  Electronically signed by Shellie Shaffer MD on 07-27-24 at 0223     Ambulatory status:   - up with 2x assist     Social issues:   Social History     Socioeconomic History    Marital status: Single   Tobacco Use    Smoking status: Former     Types: Cigarettes    Smokeless tobacco: Never    Tobacco comments:     when was teen   Vaping Use    Vaping status: Never Used   Substance and Sexual Activity    Alcohol use: No    Drug use: No    Sexual activity: Defer       Peripheral Neurovascular  Peripheral Neurovascular (Adult)  Peripheral Neurovascular WDL: WDL    Neuro Cognitive  Neuro Cognitive (Adult)  Cognitive/Neuro/Behavioral WDL:  WDL    Learning  Learning Assessment (Adult)  Learning Readiness and Ability: no barriers identified    Respiratory  Respiratory (Adult)  Airway WDL: .WDL except  Additional Documentation: Breath Sounds (Group)  Respiratory WDL  Respiratory WDL: WDL  Breath Sounds  Breath Sounds: All Fields  All Lung Fields Breath Sounds: diminished    Abdominal Pain       Pain Assessments  Pain (Adult)  (0-10) Pain Rating: Rest: 0    NIH Stroke Scale       Priscilla Broderick RN  07/27/24 06:19 EDT

## 2024-07-27 NOTE — ED PROVIDER NOTES
MD ATTESTATION NOTE    SHARED VISIT: This visit was performed by BOTH a physician and an APC. The substantive portion of the medical decision making was performed by this attesting physician who made or approved the management plan and takes responsibility for patient management. All studies documented in the APC note (if performed) were independently interpreted by me.    The SARA and I have discussed this patient's history, physical exam, MDM, and treatment plan.  I have reviewed the documentation and personally had a face to face interaction with the patient. The attached note describes my personal findings.      Latrell Canela is a 74 y.o. male who presents to the ED c/o acute dizziness.  Patient states that he took his doxycycline empty stomach and since then has a little bit lightheaded.  Mom also reports 1 episode of vomiting.  No fever or chills no chest pain.    On exam:  GENERAL: not distressed  HENT: nares patent  EYES: no scleral icterus  CV: regular rhythm, regular rate  RESPIRATORY: normal effort  ABDOMEN: soft  MUSCULOSKELETAL: no deformity  NEURO: alert, moves all extremities, follows commands  SKIN: warm, dry    Labs  Recent Results (from the past 24 hour(s))   Comprehensive Metabolic Panel    Collection Time: 07/27/24  1:15 AM    Specimen: Blood   Result Value Ref Range    Glucose 97 65 - 99 mg/dL    BUN 20 8 - 23 mg/dL    Creatinine 0.84 0.76 - 1.27 mg/dL    Sodium 132 (L) 136 - 145 mmol/L    Potassium 3.6 3.5 - 5.2 mmol/L    Chloride 93 (L) 98 - 107 mmol/L    CO2 24.0 22.0 - 29.0 mmol/L    Calcium 9.1 8.6 - 10.5 mg/dL    Total Protein 6.4 6.0 - 8.5 g/dL    Albumin 3.6 3.5 - 5.2 g/dL    ALT (SGPT) 52 (H) 1 - 41 U/L    AST (SGOT) 52 (H) 1 - 40 U/L    Alkaline Phosphatase 116 39 - 117 U/L    Total Bilirubin 0.5 0.0 - 1.2 mg/dL    Globulin 2.8 gm/dL    A/G Ratio 1.3 g/dL    BUN/Creatinine Ratio 23.8 7.0 - 25.0    Anion Gap 15.0 5.0 - 15.0 mmol/L    eGFR 92.7 >60.0 mL/min/1.73   Protime-INR     Collection Time: 07/27/24  1:15 AM    Specimen: Blood   Result Value Ref Range    Protime 16.6 (H) 11.7 - 14.2 Seconds    INR 1.32 (H) 0.90 - 1.10   aPTT    Collection Time: 07/27/24  1:15 AM    Specimen: Blood   Result Value Ref Range    PTT 27.7 22.7 - 35.4 seconds   Single High Sensitivity Troponin T    Collection Time: 07/27/24  1:15 AM    Specimen: Blood   Result Value Ref Range    HS Troponin T 39 (H) <22 ng/L   BNP    Collection Time: 07/27/24  1:15 AM    Specimen: Blood   Result Value Ref Range    proBNP 192.0 0.0 - 900.0 pg/mL   Magnesium    Collection Time: 07/27/24  1:15 AM    Specimen: Blood   Result Value Ref Range    Magnesium 1.9 1.6 - 2.4 mg/dL   CBC Auto Differential    Collection Time: 07/27/24  1:15 AM    Specimen: Blood   Result Value Ref Range    WBC 9.13 3.40 - 10.80 10*3/mm3    RBC 2.89 (L) 4.14 - 5.80 10*6/mm3    Hemoglobin 8.6 (L) 13.0 - 17.7 g/dL    Hematocrit 26.2 (L) 37.5 - 51.0 %    MCV 90.7 79.0 - 97.0 fL    MCH 29.8 26.6 - 33.0 pg    MCHC 32.8 31.5 - 35.7 g/dL    RDW 15.8 (H) 12.3 - 15.4 %    RDW-SD 49.2 37.0 - 54.0 fl    MPV 8.8 6.0 - 12.0 fL    Platelets 702 (H) 140 - 450 10*3/mm3    Neutrophil % 79.3 (H) 42.7 - 76.0 %    Lymphocyte % 8.4 (L) 19.6 - 45.3 %    Monocyte % 10.0 5.0 - 12.0 %    Eosinophil % 0.7 0.3 - 6.2 %    Basophil % 0.4 0.0 - 1.5 %    Immature Grans % 1.2 (H) 0.0 - 0.5 %    Neutrophils, Absolute 7.24 (H) 1.70 - 7.00 10*3/mm3    Lymphocytes, Absolute 0.77 0.70 - 3.10 10*3/mm3    Monocytes, Absolute 0.91 (H) 0.10 - 0.90 10*3/mm3    Eosinophils, Absolute 0.06 0.00 - 0.40 10*3/mm3    Basophils, Absolute 0.04 0.00 - 0.20 10*3/mm3    Immature Grans, Absolute 0.11 (H) 0.00 - 0.05 10*3/mm3    nRBC 0.0 0.0 - 0.2 /100 WBC       Radiology  CT Head Without Contrast    Result Date: 7/27/2024  Patient: ANTHONY JACOB  Time Out: 03:20 Exam(s): CT HEAD Without Contrast EXAM:   CT Head Without Intravenous Contrast CLINICAL HISTORY:    Reason for exam: dizziness. TECHNIQUE:   Axial  computed tomography images of the head brain without intravenous contrast.  CTDI is 54.92 mGy and DLP is 1071.2 mGy-cm.  This CT exam was performed according to the principle of ALARA (As Low As Reasonably Achievable) by using one or more of the following dose reduction techniques: automated exposure control, adjustment of the mA and or kV according to patient size, and or use of iterative reconstruction technique. COMPARISON:   No relevant prior studies available. FINDINGS:   Brain:  Mild ischemic microangiopathy.  Age appropriate cerebral volume loss.  No hemorrhage.   Ventricles:  Unremarkable.  No ventriculomegaly.   Bones joints:  Unremarkable.  No acute fracture.   Soft tissues:  Unremarkable.   Sinuses:  Unremarkable as visualized.  No acute sinusitis.   Mastoid air cells:  Unremarkable as visualized.  No mastoid effusion. IMPRESSION:       No acute findings in the head brain.     Electronically signed by Speedy Cerrato MD on 07-27-24 at 0320    CT Angiogram Chest Pulmonary Embolism    Result Date: 7/27/2024  Patient: ANTHONY JACOB  Time Out: 03:16 Exam(s): CTA CHEST EXAM:   CT Angiography Chest With Intravenous Contrast CLINICAL HISTORY:    Reason for exam: soa. TECHNIQUE:   Axial computed tomographic angiography images of the chest with intravenous contrast.  CTDI is 3.87 mGy and DLP is 148.8 mGy-cm.  This CT exam was performed according to the principle of ALARA (As Low As Reasonably Achievable) by using one or more of the following dose reduction techniques: automated exposure control, adjustment of the mA and or kV according to patient size, and or use of iterative reconstruction technique.   3D and MIP reconstructed images were created and reviewed. COMPARISON:   No relevant prior studies available. FINDINGS:   Pulmonary arteries:  Unremarkable.  No pulmonary embolism.   Aorta:  No acute findings.  No thoracic aortic aneurysm.   Lungs:  Unremarkable.  No mass.  No consolidation.   Pleural space:   Unremarkable.  No significant effusion.  No pneumothorax.   Heart:  Unremarkable.  No cardiomegaly.  No significant pericardial effusion.  No evidence of RV dysfunction.   Bones joints:  No acute fracture.  No dislocation.   Soft tissues:  Unremarkable.   Lymph nodes:  Unremarkable.  No enlarged lymph nodes. IMPRESSION:       Normal chest CTA.  No pulmonary embolism.     Electronically signed by Speedy Cerrato MD on 07-27-24 at 0316    XR Chest 1 View    Result Date: 7/27/2024  Patient: ANTHONY JACOB  Time Out: 02:23 Exam(s): XR CXR 1 VIEW EXAM:   XR Chest, 1 View CLINICAL HISTORY:    Reason for exam: soa. TECHNIQUE:   Frontal view of the chest. COMPARISON:   No relevant prior studies available. FINDINGS:   Lungs:  No consolidation or mass.   Pleural space:  No acute findings   Heart:  No cardiomegaly.   Bones joints:  No acute findings. IMPRESSION:       No acute cardiopulmonary process.     Electronically signed by Shellie Shaffer MD on 07-27-24 at 0223     Medications given in the ED:  Medications   sodium chloride 0.9 % flush 10 mL (has no administration in time range)   sodium chloride 0.9 % bolus 1,000 mL (0 mL Intravenous Stopped 7/27/24 0309)   ondansetron (ZOFRAN) injection 4 mg (4 mg Intravenous Given 7/27/24 0144)   iopamidol (ISOVUE-370) 76 % injection 100 mL (95 mL Intravenous Given 7/27/24 0257)       Orders placed during this visit:  Orders Placed This Encounter   Procedures    XR Chest 1 View    CT Angiogram Chest Pulmonary Embolism    CT Head Without Contrast    Comprehensive Metabolic Panel    Protime-INR    aPTT    Urinalysis With Microscopic If Indicated (No Culture) - Urine, Clean Catch    Single High Sensitivity Troponin T    BNP    Magnesium    CBC Auto Differential    Continuous Pulse Oximetry    Monitor Blood Pressure    Insert Peripheral IV    CBC & Differential       Medical Decision Making:  ED Course as of 07/27/24 0432   Sat Jul 27, 2024   0127 I discussed the case with   Jacquelyn and he agrees to evaluate the patient at the bedside.    [AR]   0407 HS Troponin T(!): 39 [AR]   0409 WBC: 9.13 [AR]      ED Course User Index  [AR] Lakshmi Box APRN       Differential diagnosis:  Nausea vomiting, orthostasis, dehydration    Diagnosis  Final diagnoses:   None          Latrell Valladares MD  08/02/24 0646

## 2024-07-27 NOTE — PROGRESS NOTES
"[facility] Clinical Pharmacy Services: Vancomycin Pharmacokinetic Initial Consult Note    Latrell Canela is a 74 y.o. male who is on day 1 of pharmacy to dose vancomycin.    Indication: Skin and Soft Tissue  Consulting Provider: Dr. Farmer  Planned Duration of Therapy: 7 days  Loading Dose Ordered or Given: 1750 mg on 7/27 at 2000  Culture/Source: N/A  Target: -600 mg/L.hr   Other Antimicrobials: N/A    Vitals/Labs  Ht: 190.5 cm (75\"); Wt: 88.7 kg (195 lb 8.8 oz)  Temp Readings from Last 1 Encounters:   07/27/24 97.9 °F (36.6 °C) (Oral)    Estimated Creatinine Clearance: 99.7 mL/min (by C-G formula based on SCr of 0.84 mg/dL).     Results from last 7 days   Lab Units 07/27/24  0115   CREATININE mg/dL 0.84   WBC 10*3/mm3 9.13     Assessment/Plan:    Vancomycin Dose:   1000 mg IV every  12  hours  Predictive AUC level for the dose ordered is 504 mg/L.hr, which is within the target of 400-600 mg/L.hr  Vanc Trough has been ordered for 7/29 at 0730     Pharmacy will follow patient's kidney function and will adjust doses and obtain levels as necessary. Thank you for involving pharmacy in this patient's care. Please contact pharmacy with any questions or concerns.                           Justin Couch III, [facility]  Clinical Pharmacist    "

## 2024-07-27 NOTE — PLAN OF CARE
Pt oriented, vs stable, NAD. R BKA prostethis at bedside. GI consulted.       Problem: Adult Inpatient Plan of Care  Goal: Plan of Care Review  Outcome: Ongoing, Progressing  Goal: Patient-Specific Goal (Individualized)  Outcome: Ongoing, Progressing  Goal: Absence of Hospital-Acquired Illness or Injury  Outcome: Ongoing, Progressing  Intervention: Identify and Manage Fall Risk  Recent Flowsheet Documentation  Taken 7/27/2024 1400 by Lizbeth Zamora RN  Safety Promotion/Fall Prevention:   activity supervised   assistive device/personal items within reach   clutter free environment maintained   fall prevention program maintained   nonskid shoes/slippers when out of bed   room organization consistent   safety round/check completed  Taken 7/27/2024 0910 by Lizbeth Zamora RN  Safety Promotion/Fall Prevention:   activity supervised   assistive device/personal items within reach   clutter free environment maintained   fall prevention program maintained   nonskid shoes/slippers when out of bed   room organization consistent   safety round/check completed  Intervention: Prevent Skin Injury  Recent Flowsheet Documentation  Taken 7/27/2024 1400 by Lizbeth Zamora RN  Body Position: position changed independently  Taken 7/27/2024 1200 by Lizbeth Zamora RN  Body Position: position changed independently  Taken 7/27/2024 1000 by Lizbeth Zamora RN  Body Position: position changed independently  Taken 7/27/2024 0910 by Lizbeth Zamora RN  Body Position: position changed independently  Intervention: Prevent and Manage VTE (Venous Thromboembolism) Risk  Recent Flowsheet Documentation  Taken 7/27/2024 1400 by Lizbeth Zamora RN  Activity Management: activity encouraged  Taken 7/27/2024 0910 by Lizbeth Zamora RN  Activity Management: activity encouraged  VTE Prevention/Management: patient refused intervention  Range of Motion: active ROM (range of motion) encouraged  Intervention: Prevent Infection  Recent Flowsheet  Documentation  Taken 7/27/2024 1400 by Lizbeth Zamora RN  Infection Prevention:   single patient room provided   rest/sleep promoted  Taken 7/27/2024 0910 by Lizbeth Zamora RN  Infection Prevention:   rest/sleep promoted   single patient room provided  Goal: Optimal Comfort and Wellbeing  Outcome: Ongoing, Progressing  Intervention: Provide Person-Centered Care  Recent Flowsheet Documentation  Taken 7/27/2024 1400 by Lizbeth Zamora RN  Trust Relationship/Rapport: care explained  Taken 7/27/2024 0910 by Lizbeth Zamora RN  Trust Relationship/Rapport:   care explained   thoughts/feelings acknowledged   reassurance provided   questions encouraged   questions answered   emotional support provided  Goal: Readiness for Transition of Care  Outcome: Ongoing, Progressing  Intervention: Mutually Develop Transition Plan  Recent Flowsheet Documentation  Taken 7/27/2024 0800 by Lizbeth Zamora RN  Equipment Currently Used at Home: none  Transportation Anticipated: family or friend will provide  Patient/Family Anticipated Services at Transition: none  Patient/Family Anticipates Transition to: home     Problem: Fall Injury Risk  Goal: Absence of Fall and Fall-Related Injury  Outcome: Ongoing, Progressing  Intervention: Identify and Manage Contributors  Recent Flowsheet Documentation  Taken 7/27/2024 1400 by Lizbeth Zamora RN  Medication Review/Management: medications reviewed  Taken 7/27/2024 0910 by Lizbeth Zamora RN  Medication Review/Management: medications reviewed  Intervention: Promote Injury-Free Environment  Recent Flowsheet Documentation  Taken 7/27/2024 1400 by Lizbeth Zamora RN  Safety Promotion/Fall Prevention:   activity supervised   assistive device/personal items within reach   clutter free environment maintained   fall prevention program maintained   nonskid shoes/slippers when out of bed   room organization consistent   safety round/check completed  Taken 7/27/2024 0910 by Lizbeth Zamora, RN  Safety  Promotion/Fall Prevention:   activity supervised   assistive device/personal items within reach   clutter free environment maintained   fall prevention program maintained   nonskid shoes/slippers when out of bed   room organization consistent   safety round/check completed     Problem: Pain Chronic (Persistent)  Goal: Acceptable Pain Control and Functional Ability  Outcome: Ongoing, Progressing  Intervention: Manage Persistent Pain  Recent Flowsheet Documentation  Taken 7/27/2024 1400 by Lizbeth Zamora, RN  Medication Review/Management: medications reviewed  Taken 7/27/2024 0910 by Lizbeth Zamora RN  Medication Review/Management: medications reviewed  Intervention: Optimize Psychosocial Wellbeing  Recent Flowsheet Documentation  Taken 7/27/2024 1400 by Lizbeth Zamora RN  Diversional Activities:   television   smartphone  Family/Support System Care:   self-care encouraged   presence promoted  Taken 7/27/2024 0910 by Lizbeth Zamora RN  Diversional Activities:   smartphone   television  Family/Support System Care:   self-care encouraged   presence promoted     Problem: Skin or Soft Tissue Infection  Goal: Absence of Infection Signs and Symptoms  Outcome: Ongoing, Progressing  Intervention: Minimize and Manage Infection Progression  Recent Flowsheet Documentation  Taken 7/27/2024 1400 by Lizbeth Zamora RN  Infection Prevention:   single patient room provided   rest/sleep promoted  Taken 7/27/2024 0910 by Lizbeth Zamora RN  Infection Prevention:   rest/sleep promoted   single patient room provided   Goal Outcome Evaluation:

## 2024-07-27 NOTE — H&P
[facility]   HISTORY AND PHYSICAL    Patient Name: Latrell Canela  : 1950  MRN: [id number]  Primary Care Physician:  Nita Farmer MD  Date of admission: 2024    Subjective   Subjective     Chief Complaint:   Nausea , vomiting and dizziness x 1 day.     History of Present Illness  Patient was admitted to hospital with complaints of nausea and vomiting since last evening. He had dizziness earlier today and hence came to hospital for further care.   He has been having cellulitis of RUE x 3 weeks. He had been treated with oral and IV antibiotics. Later he developed some necrotic areas on the skin for which he was following with the [facility]. He was started on Doxycycline  100 mg BID and recommended MRI since there was possibility of deep tissue infection.   Patient denies fever or chills. There is no history of fall or blunt trauma to the arm. He is immune compromised and on immune suppressants for Rheumatoid arthritis. 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   He also has Peripheral polyneuropathy of both upper and lower extremities. He has lower extremity radicular neuropathy from lumbar spinal stenosis.     Review of Systems   Constitutional:  Positive for activity change and fatigue. Negative for appetite change, chills, diaphoresis and fever.   HENT: Negative.     Eyes: Negative.    Respiratory:  Negative for cough, chest tightness, shortness of breath and wheezing.    Cardiovascular:  Negative for chest pain and palpitations.   Gastrointestinal:  Positive for constipation, nausea and vomiting. Negative for abdominal distention, abdominal pain, anal bleeding, blood in stool and diarrhea.   Endocrine: Negative.    Genitourinary: Negative.    Musculoskeletal:  Positive for back pain and gait problem.        Has BKA of RLE   Skin:  Positive for color change and rash.   Allergic/Immunologic: Positive for immunocompromised state.   Neurological:  Positive for  dizziness and numbness. Negative for tremors, seizures, facial asymmetry, speech difficulty, weakness, light-headedness and headaches.   Psychiatric/Behavioral:  Negative for behavioral problems, confusion and dysphoric mood. The patient is hyperactive.         History of ADD        Personal History     Past Medical History:   Diagnosis Date    Abnormal ECG     Anemia     Hyperlipidemia     Hypertension     Neurogenic claudication due to lumbar spinal stenosis     Neuropathy     LLE    Rheumatoid arthritis        Past Surgical History:   Procedure Laterality Date    AMPUTATION Right     R BKA       Family History: family history includes Heart disease in his mother. Otherwise pertinent FHx was reviewed and not pertinent to current issue.    Social History:  reports that he has quit smoking. His smoking use included cigarettes. He has never used smokeless tobacco. He reports that he does not drink alcohol and does not use drugs.    Home Medications:  HYDROcodone-acetaminophen, Iron, Tofacitinib Citrate ER, aspirin, doxycycline, folic acid, gabapentin, lisinopril-hydrochlorothiazide, methotrexate, multivitamin, rosuvastatin, and traMADol HCl    Allergies:  No Known Allergies    Objective    Objective     Vitals:   Temp:  [97.7 °F (36.5 °C)-98.3 °F (36.8 °C)] 97.9 °F (36.6 °C)  Heart Rate:  [67-74] 67  Resp:  [16-20] 16  BP: (111-141)/(69-79) 111/74    Physical Exam  Constitutional:       Appearance: Normal appearance.   HENT:      Head: Normocephalic and atraumatic.      Nose: Nose normal.      Mouth/Throat:      Mouth: Mucous membranes are moist.   Eyes:      Pupils: Pupils are equal, round, and reactive to light.   Cardiovascular:      Rate and Rhythm: Normal rate and regular rhythm.      Pulses: Normal pulses.      Heart sounds: Normal heart sounds.   Pulmonary:      Effort: Pulmonary effort is normal.      Breath sounds: Normal breath sounds. No rhonchi or rales.   Chest:      Chest wall: No tenderness.    Abdominal:      General: Abdomen is flat. There is no distension.      Palpations: Abdomen is soft.      Tenderness: There is no abdominal tenderness. There is no rebound.   Musculoskeletal:      Cervical back: Normal range of motion and neck supple.      Left lower leg: Edema present.      Comments: HUANG - BORIS   Neurological:      Mental Status: He is alert.   Psychiatric:         Behavior: Behavior normal.         Result Review    Result Review:  I have personally reviewed the results from the time of this admission to 7/27/2024 17:41 EDT and agree with these findings:  [x]  Laboratory list / accordion  []  Microbiology  [x]  Radiology  []  EKG/Telemetry   []  Cardiology/Vascular   []  Pathology  []  Old records  []  Other:  Most notable findings include:      High  U      Qyamrer37.6 High  SecondsINR1.32 High      Glucose 97 mg/dL ALT (SGPT) 52 High  U/L   BUN 20 mg/dL AST (SGOT) 52 High  U/L   Creatinine 0.84 mg/dL Alkaline Phosphatase 116 U/L   Sodium 132 Low  mmol/L Total Bilirubin 0.5 mg/dL   Potassium 3.6 mmol/L Globulin 2.8 gm/dL   Chloride 93 Low  mmol/L A/G Ratio 1.3 g/dL   CO2 24.0 mmol/L BUN/Creatinine Ratio 23.8   Calcium 9.1 mg/dL Anion Gap 15.0 mmol/L   Total Protein 6.4 g/dL eGFR 92.7 mL/min/1.73   Albumin 3.6 g/dL       HS Troponin T 39 High  ng/L     Assessment & Plan   Assessment / Plan     Brief Patient Summary:  Latrell Canela is a 74 y.o. male who was admitted to hospital with complaints of nausea and vomiting since last evening. He had dizziness earlier today and hence came to hospital for further care. He had started taking Doxycycline 100 mg BID started at the [facility].   He has been having cellulitis of RUE x 3 weeks. He had been treated with oral and IV antibiotics. In spite of this he later developed 2-3 necrotic spots on the skin with persistent redness and induration for which he was following with the [facility].   Patient denies fever or chills. There is no history of fall or blunt  trauma to the arm. He is immune compromised and on immune suppressants for Rheumatoid arthritis. 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   Currently patient still has brawny induration of the dorsal aspect of RUE with swelling extending down to dorsum of the right hand. Redness is down from last week. But swelling and induration persists with 2-3 black , necrotic spots on the arm. Clinically patient does not appear to be toxic.   However will check further for deep tissue infection of right arm. He will be started on IV Vancomycin and request Hand surgeon consult.   He also has Peripheral polyneuropathy of both upper and lower extremities. He also has lower extremity radiculopathy from lumbar spinal stenosis.   Patient has been admitted for dizziness , nausea and vomiting . Most likely from Doxycycline and possible food infection. He had eaten store bought food last evening.   Patient has long history of anemia of chronic disease and is on oral iron for several years. He is found to have low H&H. His Hb has dropped from 11.4 gm from April to 8.6 now. No history of melena or BRBPR. He had colonoscopy earlier part of this year for the 5 year follow up from colon polyp.        Active Hospital Problems:  Active Hospital Problems    Diagnosis     **Dizziness      Plan:   Dizziness with nausea and vomiting : likely from Doxycycline that he was started on recently vs Food poisoning . With IV fluids and symptomatic treatment of N&V , he is feeling better.   Brawny induration and swelling of Rt forearm dorsal aspect of 3 weeks duration. Earlier US of RUE had not shown any deep fluid collection. Now with persistent symptoms and persisting brawny induration with spots of skin necrosis, will check MRI of the Right forearm to rule out deep seated infection. Also start him on IV vancomycin and request ID and surgical consult.   Elevated troponin : no chest pain or prior history of  CAD. Will monitor Troponin.   Chronic Anemia with recent drop in H&H in the last 6-8 weeks.   HTN : On Lisinopril/HCTZ . Continue  the medications.   Rheumatoid arthritis : continue the medications as recommended by dr. Sung .    VTE Prophylaxis:  No VTE prophylaxis order currently exists.               Nita Farmer MD

## 2024-07-27 NOTE — CONSULTS
[facility]   Consult Note    Patient Name: Latrell Canela  : 1950  MRN: [id number]  Primary Care Physician:  Nita Farmer MD  Referring Physician: Latrell QUAN MD  Date of admission: 2024    Inpatient Gastroenterology Consult  Consult performed by: Trell Miles MD  Consult ordered by: Nita Farmer MD        Subjective   Subjective     Reason for Consult/ Chief Complaint: anemia    History of Present Illness  Latrell Canela is a 74 y.o. male with history of RA, upper extremity cellulitis. Presented with nausea and vomiting that has improved.  He has been on doxycycline recently and not sure if he was taking it correctly.He denies nausea, vomiting, chest pain at this time . He wants to go home!     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 Otherwise pertinent FHx was reviewed and not pertinent to current issue.    Social History:  reports that he has quit smoking. His smoking use included cigarettes. He has never used smokeless tobacco. He reports that he does not drink alcohol and does not use drugs.    Home Medications:   HYDROcodone-acetaminophen, Iron, Tofacitinib Citrate ER, aspirin, doxycycline, folic acid, gabapentin, lisinopril-hydrochlorothiazide, methotrexate, multivitamin, rosuvastatin, and traMADol HCl    Allergies:  No Known Allergies    Objective    Objective     Vitals:  Temp:  [97.7 °F (36.5 °C)-98.3 °F (36.8 °C)] 97.9 °F (36.6 °C)  Heart Rate:  [67-74] 67  Resp:  [16-20] 16  BP: (111-141)/(69-79) 111/74    Physical Exam  HENT:      Right Ear: External ear normal.      Left Ear: External ear normal.      Mouth/Throat:      Pharynx: Oropharynx is clear.   Cardiovascular:       Rate and Rhythm: Normal rate.   Pulmonary:      Effort: Pulmonary effort is normal.   Abdominal:      General: Abdomen is flat.   Musculoskeletal:         General: Swelling present.      Comments: RUE forearm erythema, swelling    Neurological:      General: No focal deficit present.      Mental Status: He is alert.         Result Review    Result Review:  I have personally reviewed the results from the time of this admission to 7/27/2024 16:23 EDT and agree with these findings:  []  Laboratory list / accordion  []  Microbiology  []  Radiology  []  EKG/Telemetry   []  Cardiology/Vascular   []  Pathology  []  Old records  []  Other:  Most notable findings include:       Assessment & Plan   Assessment / Plan     Brief Patient Summary:  Latrell Canela is a 74 y.o. male who   Nausea improved  Possible pill injury from doxycycline, no chest pain  Anemia - iron studies suggest anemia of chronic disease and the increased ldh and reticulocytes suggest possible hemolysis  No evidence of active GI bleed    Active Hospital Problems:  Active Hospital Problems    Diagnosis     **Dizziness      Plan will monitor clinically, no urgent need for endoscopy at this time       Trell Miles MD

## 2024-07-27 NOTE — ED PROVIDER NOTES
" EMERGENCY DEPARTMENT ENCOUNTER  Room Number:  03/03  PCP: Nita Farmer MD  Independent Historians: {Historian:53515::\"Patient\"}      HPI:  Chief Complaint: had concerns including Dizziness. ***    A complete HPI/ROS/PMH/PSH/SH/FH are unobtainable due to: {EM_Unobtainable History (Optional):68871::\"None\"}    Chronic or social conditions impacting patient care (Social Determinants of Health): {EM_SDOH (Optional):48374::\"None\"}      Context: Latrell Canela is a 74 y.o. male with a medical history of ***       Review of prior external notes (non-ED) -and- Review of prior external test results outside of this encounter:   [facility] EPIC [facility] notes and no prior diagnostic studies available for review. ***      PAST MEDICAL HISTORY  Active Ambulatory Problems     Diagnosis Date Noted    Abnormal EKG 01/02/2019    Neurogenic claudication due to lumbar spinal stenosis 04/23/2024     Resolved Ambulatory Problems     Diagnosis Date Noted    Cellulitis, unspecified cellulitis site 07/09/2024     Past Medical History:   Diagnosis Date    Abnormal ECG     Hyperlipidemia     Hypertension          PAST SURGICAL HISTORY  Past Surgical History:   Procedure Laterality Date    AMPUTATION           FAMILY HISTORY  Family History   Problem Relation Age of Onset    Heart disease Mother          SOCIAL HISTORY  Social History     Socioeconomic History    Marital status: Single   Tobacco Use    Smoking status: Former     Types: Cigarettes    Smokeless tobacco: Never    Tobacco comments:     when was teen   Vaping Use    Vaping status: Never Used   Substance and Sexual Activity    Alcohol use: No    Drug use: No    Sexual activity: Defer         ALLERGIES  Patient has no known allergies.      REVIEW OF SYSTEMS  Included in HPI  All systems reviewed and negative except for those discussed in HPI.      PHYSICAL EXAM    I have reviewed the triage vital signs and nursing " notes.    ED Triage Vitals [07/27/24 0104]   Temp Heart Rate Resp BP SpO2   98.3 °F (36.8 °C) 74 20 141/74 99 %      Temp src Heart Rate Source Patient Position BP Location FiO2 (%)   Tympanic Monitor Sitting Right arm --         LAB RESULTS  No results found for this or any previous visit (from the past 24 hour(s)).      RADIOLOGY  No Radiology Exams Resulted Within Past 24 Hours      MEDICATIONS GIVEN IN ER  Medications - No data to display        OUTPATIENT MEDICATION MANAGEMENT:  No current Epic-ordered facility-administered medications on file.     Current Outpatient Medications Ordered in Epic   Medication Sig Dispense Refill    aspirin 81 MG EC tablet Take 1 tablet by mouth Daily.      folic acid (FOLVITE) 1 MG tablet Take  by mouth Daily.  3    gabapentin (NEURONTIN) 300 MG capsule Take 1 capsule by mouth 3 times a day.      HYDROcodone-acetaminophen (NORCO) 5-325 MG per tablet Take 1 tablet by mouth every night at bedtime.      IRON CR PO Take  by mouth.      lisinopril-hydrochlorothiazide (PRINZIDE,ZESTORETIC) 20-12.5 MG per tablet Take 1 tablet by mouth Daily.      methotrexate 2.5 MG tablet TK 6 TS PO 1 TIME A WK WF  5    Multiple Vitamin (MULTI-DAY PO) Take  by mouth.      rosuvastatin (CRESTOR) 10 MG tablet Take 1 tablet by mouth every night at bedtime.      traMADol HCl (ULTRAM) 100 MG tablet Take 1 tablet by mouth 3 times a day.      Xeljanz XR 11 MG tablet sustained-release 24 hour Take 1 tablet by mouth Daily.           PROCEDURES  Procedures        PROGRESS, DATA ANALYSIS, CONSULTS, AND MEDICAL DECISION MAKING  ORDERS PLACED DURING THIS VISIT:  No orders of the defined types were placed in this encounter.      All labs have been independently interpreted by me.  All radiology studies have been reviewed by me. All EKG's have been independently viewed by me.  Discussion below represents my analysis of pertinent findings related to patient's condition, differential diagnosis, treatment plan and final  disposition.    Differential diagnosis includes but is not limited to:   ***    ED Course/Progress Notes/MDM:           AS OF 01:07 EDT VITALS:    BP - 141/74  HR - 74  TEMP - 98.3 °F (36.8 °C) (Tympanic)  O2 SATS - 99%    Clinical Scores:                  MDM:  ***      COMPLEXITY OF CARE  {Admission Statement:65701}    {EM_CC (Optional):90627}    DIAGNOSIS  Final diagnoses:   None         DISPOSITION  ED Disposition       None               {EM_PPE (Optional):00850}    Please note that portions of this document were completed with a voice recognition program.    Note Disclaimer: At [facility], we believe that sharing information builds trust and better relationships. You are receiving this note because you recently visited [facility]. It is possible you will see health information before a provider has talked with you about it. This kind of information can be easy to misunderstand. To help you fully understand what it means for your health, we urge you to discuss this note with your provider.     Clinicians would have to utilize clinical acumen, EKG, Troponin, and serial changes to determine if it is an Acute Myocardial Infarction or myocardial injury due to an underlying chronic condition.        HAPTOGLOBIN - Abnormal; Notable for the following components:    Haptoglobin 401 (*)     All other components within normal limits   HIGH SENSITIVITIY TROPONIN T 2HR - Abnormal; Notable for the following components:    HS Troponin T 33 (*)     All other components within normal limits    Narrative:     High Sensitive Troponin T Reference Range:  <14.0 ng/L- Negative Female for AMI  <22.0 ng/L- Negative Male for AMI  >=14 - Abnormal Female indicating possible myocardial injury.  >=22 - Abnormal Male indicating possible myocardial injury.   Clinicians would have to utilize clinical acumen, EKG, Troponin, and serial changes to determine if it is an Acute Myocardial Infarction or myocardial injury due to an underlying chronic condition.        COMPREHENSIVE METABOLIC PANEL - Abnormal; Notable for the following components:    Sodium 134 (*)     ALT (SGPT) 42 (*)     AST (SGOT) 42 (*)     All other components within normal limits    Narrative:     GFR Normal >60  Chronic Kidney Disease <60  Kidney Failure <15    The GFR formula is only valid for adults with stable renal function between ages 18 and 70.   CBC WITH AUTO DIFFERENTIAL - Abnormal; Notable for the following components:    RBC 2.97 (*)     Hemoglobin 8.8 (*)     Hematocrit 26.5 (*)     RDW 15.8 (*)     Platelets 744 (*)     Neutrophil % 76.2 (*)     Lymphocyte % 6.9 (*)     Monocyte % 12.8 (*)     Immature Grans % 1.6 (*)     Lymphocytes, Absolute 0.61 (*)     Monocytes, Absolute 1.14 (*)     Immature Grans, Absolute 0.14 (*)     All other components within normal limits   BASIC METABOLIC PANEL - Abnormal; Notable for the following components:    Creatinine 0.75 (*)     All other components within normal limits    Narrative:     GFR Normal >60  Chronic Kidney  Disease <60  Kidney Failure <15    The GFR formula is only valid for adults with stable renal function between ages 18 and 70.   BLOOD CULTURE - Normal   BLOOD CULTURE - Normal   APTT - Normal   BNP (IN-HOUSE) - Normal    Narrative:     This assay is used as an aid in the diagnosis of individuals suspected of having heart failure. It can be used as an aid in the diagnosis of acute decompensated heart failure (ADHF) in patients presenting with signs and symptoms of ADHF to the emergency department (ED). In addition, NT-proBNP of <300 pg/mL indicates ADHF is not likely.    Age Range Result Interpretation  NT-proBNP Concentration (pg/mL:      <50             Positive            >450                   Gray                 300-450                    Negative             <300    50-75           Positive            >900                  Gray                300-900                  Negative            <300      >75             Positive            >1800                  Gray                300-1800                  Negative            <300   MAGNESIUM - Normal   RETICULOCYTES - Normal   VANCOMYCIN, TROUGH - Normal    Narrative:     Therapeutic Ranges for Vancomycin    Vancomycin Random   5.0-40.0 mcg/mL  Vancomycin Trough   5.0-20.0 mcg/mL  Vancomycin Peak     20.0-40.0 mcg/mL   BASIC METABOLIC PANEL - Normal    Narrative:     GFR Normal >60  Chronic Kidney Disease <60  Kidney Failure <15    The GFR formula is only valid for adults with stable renal function between ages 18 and 70.   VANCOMYCIN, TROUGH - Normal    Narrative:     Therapeutic Ranges for Vancomycin    Vancomycin Random   5.0-40.0 mcg/mL  Vancomycin Trough   5.0-20.0 mcg/mL  Vancomycin Peak     20.0-40.0 mcg/mL   AFB CULTURE   FUNGUS CULTURE   TISSUE / BONE CULTURE   TISSUE PATHOLOGY EXAM   CBC AND DIFFERENTIAL    Narrative:     The following orders were created for panel order CBC & Differential.  Procedure                               Abnormality          Status                     ---------                               -----------         ------                     CBC Auto Differential[[id number]]        Abnormal            Final result                 Please view results for these tests on the individual orders.   CBC AND DIFFERENTIAL    Narrative:     The following orders were created for panel order CBC & Differential.  Procedure                               Abnormality         Status                     ---------                               -----------         ------                     CBC Auto Differential[[id number]]        Abnormal            Final result                 Please view results for these tests on the individual orders.           RADIOLOGY  MRI Radius Ulna Right Without Contrast   Final Result   Limited study demonstrating diffuse subcutaneous soft tissue edema and   edema throughout the soft tissues and muscles of the deep and   superficial dorsal compartments of the forearm and the radial group   forearm muscles, which could represent cellulitis and myositis, with   partially imaged extensor carpi ulnaris tenosynovitis. Recommend   clinical exclusion of compartment syndrome. No well-formed or drainable   fluid collection is seen to suggest abscess.       This report was finalized on 7/29/2024 3:27 PM by Jag Swift MD on   Workstation: [id number]          CT Angiogram Chest Pulmonary Embolism   Final Result         Electronically signed by Speedy Cerrato MD on 07-27-24 at 0316      CT Head Without Contrast   Final Result         Electronically signed by Speedy Cerrato MD on 07-27-24 at 0320      XR Chest 1 View   Final Result         Electronically signed by Shellie Shaffer MD on 07-27-24 at 0223              MEDICATIONS GIVEN IN ER  Medications   sodium chloride 0.9 % bolus 1,000 mL (0 mL Intravenous Stopped 7/27/24 0309)   ondansetron (ZOFRAN) injection 4 mg (4 mg Intravenous Given 7/27/24 0144)   iopamidol (ISOVUE-370) 76 %  injection 100 mL (95 mL Intravenous Given 7/27/24 0257)   vancomycin 1750 mg/500 mL 0.9% NS IVPB ([facility]) (0 mg Intravenous Stopped 7/28/24 0130)   bisacodyl (DULCOLAX) EC tablet 10 mg (10 mg Oral Given 7/28/24 0109)   LORazepam (ATIVAN) tablet 0.5 mg (0.5 mg Oral Given 7/27/24 2326)   lidocaine 1% - EPINEPHrine 1:753555 (XYLOCAINE W/EPI) 1 %-1:011633 injection 10 mL (10 mL Injection Given 7/30/24 1518)           OUTPATIENT MEDICATION MANAGEMENT:  No current Epic-ordered facility-administered medications on file.     Current Outpatient Medications Ordered in Epic   Medication Sig Dispense Refill    aspirin 81 MG EC tablet Take 1 tablet by mouth Daily.      folic acid (FOLVITE) 1 MG tablet Take  by mouth Daily.  3    gabapentin (NEURONTIN) 300 MG capsule Take 1 capsule by mouth 3 times a day.      HYDROcodone-acetaminophen (NORCO) 5-325 MG per tablet Take 1 tablet by mouth every night at bedtime.      IRON CR PO Take  by mouth.      lisinopril-hydrochlorothiazide (PRINZIDE,ZESTORETIC) 20-12.5 MG per tablet Take 1 tablet by mouth Daily.      methotrexate 2.5 MG tablet TK 6 TS PO 1 TIME A WK WF  5    Multiple Vitamin (MULTI-DAY PO) Take  by mouth.      rosuvastatin (CRESTOR) 10 MG tablet Take 1 tablet by mouth every night at bedtime.      traMADol HCl (ULTRAM) 100 MG tablet Take 1 tablet by mouth 3 times a day.      Xeljanz XR 11 MG tablet sustained-release 24 hour Take 1 tablet by mouth Daily.             PROGRESS, DATA ANALYSIS, CONSULTS, AND MEDICAL DECISION MAKING  ORDERS PLACED DURING THIS VISIT:  Orders Placed This Encounter   Procedures    MRSA Screen, PCR (Inpatient) - Swab, Nares    Blood Culture - Blood,    Blood Culture - Blood,    AFB Culture - Tissue, Forearm, Right    Fungus Culture - Tissue, Forearm, Right    Tissue / Bone Culture - Tissue, Forearm, Right    XR Chest 1 View    CT Angiogram Chest Pulmonary Embolism    CT Head Without Contrast    MRI Radius Ulna Right Without Contrast    Comprehensive  Metabolic Panel    Protime-INR    aPTT    Urinalysis With Microscopic If Indicated (No Culture) - Urine, Clean Catch    Single High Sensitivity Troponin T    BNP    Magnesium    CBC Auto Differential    Ferritin    Lactate Dehydrogenase    Reticulocytes    High Sensitivity Troponin T    Comprehensive Metabolic Panel    Haptoglobin    Vancomycin, Trough    High Sensitivity Troponin T 2Hr    CBC Auto Differential    Basic Metabolic Panel    Vancomycin, Trough    Continuous Pulse Oximetry    Monitor Blood Pressure    Please order arm sling for patient at discharge  Nursing Communication    Please call ortho surgery per ID recommendations for possible skin biopsy  Nursing Communication    Obtain Informed Consent    Inpatient Gastroenterology Consult    Inpatient Infectious Diseases Consult    Inpatient Hand Surgery Consult    Inpatient General Surgery Consult    Telemetry Scan    Telemetry Scan    Telemetry Scan    Telemetry Scan    Telemetry Scan    Wound Ostomy Eval & Treat    Initiate Observation Status    Inpatient Admission    Discharge patient    CBC & Differential    CBC & Differential       All labs have been independently interpreted by me.  All radiology studies have been reviewed by me. All EKG's have been independently viewed by me.  Discussion below represents my analysis of pertinent findings related to patient's condition, differential diagnosis, treatment plan and final disposition.    Differential diagnosis includes but is not limited to:   Electrolyte imbalance, anemia, ICH, Vertigo, etc.    ED Course/Progress Notes/MDM:  ED Course as of 08/07/24 2203   Sat Jul 27, 2024   0127 I discussed the case with Dr. Valladares and he agrees to evaluate the patient at the bedside.    [AR]   0233 Hemoglobin(!): 8.6 [AR]   0407 HS Troponin T(!): 39 [AR]   0409 WBC: 9.13 [AR]   0542 Patient reassessed.  Discussed ED findings, differential diagnosis, and the need for admission for evaluation/treatment.  They are  agreeable to admission and all questions were answered.     [AR]   0601 Phone call with Dr. Zayas.  Discussed the patient, relevant history, exam, diagnostics, ED findings/progress, and concerns. They agree to admit the patient for further evaluation of dizziness, nausea, vomiting, and anemia. Care assumed by the admitting physician at this time.     [AR]      ED Course User Index  [AR] Lakshmi Box APRN           COMPLEXITY OF CARE  The patient requires admission.        DIAGNOSIS  Final diagnoses:   Dizziness   Anemia, unspecified type         DISPOSITION  ED Disposition       ED Disposition   Decision to Admit    Condition   --    Comment   Level of Care: Telemetry [5]   Diagnosis: Dizziness [115223]   Admitting Physician: CINDY ZAYAS [[id number]]   Attending Physician: CINDY ZAYAS [[id number]]                        Please note that portions of this document were completed with a voice recognition program.    Note Disclaimer: At [facility], we believe that sharing information builds trust and better relationships. You are receiving this note because you recently visited [facility]. It is possible you will see health information before a provider has talked with you about it. This kind of information can be easy to misunderstand. To help you fully understand what it means for your health, we urge you to discuss this note with your provider.     Lakshmi Bxo APRN  08/07/24 5213

## 2024-07-28 LAB
ALBUMIN SERPL-MCNC: 3.5 G/DL (ref 3.5–5.2)
ALBUMIN/GLOB SERPL: 1.3 G/DL
ALP SERPL-CCNC: 116 U/L (ref 39–117)
ALT SERPL W P-5'-P-CCNC: 42 U/L (ref 1–41)
ANION GAP SERPL CALCULATED.3IONS-SCNC: 7.1 MMOL/L (ref 5–15)
AST SERPL-CCNC: 42 U/L (ref 1–40)
BASOPHILS # BLD AUTO: 0.06 10*3/MM3 (ref 0–0.2)
BASOPHILS NFR BLD AUTO: 0.7 % (ref 0–1.5)
BILIRUB SERPL-MCNC: 0.3 MG/DL (ref 0–1.2)
BUN SERPL-MCNC: 13 MG/DL (ref 8–23)
BUN/CREAT SERPL: 16.5 (ref 7–25)
CALCIUM SPEC-SCNC: 8.8 MG/DL (ref 8.6–10.5)
CHLORIDE SERPL-SCNC: 101 MMOL/L (ref 98–107)
CO2 SERPL-SCNC: 25.9 MMOL/L (ref 22–29)
CREAT SERPL-MCNC: 0.79 MG/DL (ref 0.76–1.27)
DEPRECATED RDW RBC AUTO: 48.1 FL (ref 37–54)
EGFRCR SERPLBLD CKD-EPI 2021: 94.4 ML/MIN/1.73
EOSINOPHIL # BLD AUTO: 0.16 10*3/MM3 (ref 0–0.4)
EOSINOPHIL NFR BLD AUTO: 1.8 % (ref 0.3–6.2)
ERYTHROCYTE [DISTWIDTH] IN BLOOD BY AUTOMATED COUNT: 15.8 % (ref 12.3–15.4)
GLOBULIN UR ELPH-MCNC: 2.7 GM/DL
GLUCOSE SERPL-MCNC: 99 MG/DL (ref 65–99)
HCT VFR BLD AUTO: 26.5 % (ref 37.5–51)
HGB BLD-MCNC: 8.8 G/DL (ref 13–17.7)
IMM GRANULOCYTES # BLD AUTO: 0.14 10*3/MM3 (ref 0–0.05)
IMM GRANULOCYTES NFR BLD AUTO: 1.6 % (ref 0–0.5)
LYMPHOCYTES # BLD AUTO: 0.61 10*3/MM3 (ref 0.7–3.1)
LYMPHOCYTES NFR BLD AUTO: 6.9 % (ref 19.6–45.3)
MCH RBC QN AUTO: 29.6 PG (ref 26.6–33)
MCHC RBC AUTO-ENTMCNC: 33.2 G/DL (ref 31.5–35.7)
MCV RBC AUTO: 89.2 FL (ref 79–97)
MONOCYTES # BLD AUTO: 1.14 10*3/MM3 (ref 0.1–0.9)
MONOCYTES NFR BLD AUTO: 12.8 % (ref 5–12)
MRSA DNA SPEC QL NAA+PROBE: ABNORMAL
NEUTROPHILS NFR BLD AUTO: 6.78 10*3/MM3 (ref 1.7–7)
NEUTROPHILS NFR BLD AUTO: 76.2 % (ref 42.7–76)
NRBC BLD AUTO-RTO: 0 /100 WBC (ref 0–0.2)
PLATELET # BLD AUTO: 744 10*3/MM3 (ref 140–450)
PMV BLD AUTO: 9 FL (ref 6–12)
POTASSIUM SERPL-SCNC: 3.8 MMOL/L (ref 3.5–5.2)
PROT SERPL-MCNC: 6.2 G/DL (ref 6–8.5)
RBC # BLD AUTO: 2.97 10*6/MM3 (ref 4.14–5.8)
SODIUM SERPL-SCNC: 134 MMOL/L (ref 136–145)
WBC NRBC COR # BLD AUTO: 8.89 10*3/MM3 (ref 3.4–10.8)

## 2024-07-28 PROCEDURE — 87641 MR-STAPH DNA AMP PROBE: CPT | Performed by: INTERNAL MEDICINE

## 2024-07-28 PROCEDURE — 25010000002 VANCOMYCIN 1 G RECONSTITUTED SOLUTION 1 EACH VIAL: Performed by: INTERNAL MEDICINE

## 2024-07-28 PROCEDURE — 25810000003 SODIUM CHLORIDE 0.9 % SOLUTION 250 ML FLEX CONT: Performed by: INTERNAL MEDICINE

## 2024-07-28 PROCEDURE — 87040 BLOOD CULTURE FOR BACTERIA: CPT | Performed by: INTERNAL MEDICINE

## 2024-07-28 PROCEDURE — 80053 COMPREHEN METABOLIC PANEL: CPT | Performed by: INTERNAL MEDICINE

## 2024-07-28 PROCEDURE — 99232 SBSQ HOSP IP/OBS MODERATE 35: CPT | Performed by: INTERNAL MEDICINE

## 2024-07-28 PROCEDURE — 85025 COMPLETE CBC W/AUTO DIFF WBC: CPT | Performed by: INTERNAL MEDICINE

## 2024-07-28 RX ADMIN — Medication 1 TABLET: at 08:26

## 2024-07-28 RX ADMIN — SODIUM CHLORIDE 1000 MG: 9 INJECTION, SOLUTION INTRAVENOUS at 21:08

## 2024-07-28 RX ADMIN — FOLIC ACID 1 MG: 1 TABLET ORAL at 08:25

## 2024-07-28 RX ADMIN — LISINOPRIL: 20 TABLET ORAL at 08:25

## 2024-07-28 RX ADMIN — Medication: at 01:10

## 2024-07-28 RX ADMIN — ROSUVASTATIN CALCIUM 10 MG: 10 TABLET, FILM COATED ORAL at 21:20

## 2024-07-28 RX ADMIN — SODIUM CHLORIDE 1000 MG: 9 INJECTION, SOLUTION INTRAVENOUS at 10:04

## 2024-07-28 RX ADMIN — GABAPENTIN 100 MG: 100 CAPSULE ORAL at 08:26

## 2024-07-28 RX ADMIN — TRAMADOL HYDROCHLORIDE 100 MG: 50 TABLET ORAL at 17:30

## 2024-07-28 RX ADMIN — ASPIRIN 81 MG: 81 TABLET, CHEWABLE ORAL at 08:26

## 2024-07-28 RX ADMIN — BISACODYL 10 MG: 5 TABLET, COATED ORAL at 01:09

## 2024-07-28 RX ADMIN — GABAPENTIN 100 MG: 100 CAPSULE ORAL at 17:30

## 2024-07-28 RX ADMIN — TRAMADOL HYDROCHLORIDE 100 MG: 50 TABLET ORAL at 01:09

## 2024-07-28 RX ADMIN — FERROUS SULFATE TAB 325 MG (65 MG ELEMENTAL FE) 325 MG: 325 (65 FE) TAB at 08:26

## 2024-07-28 RX ADMIN — TRAMADOL HYDROCHLORIDE 100 MG: 50 TABLET ORAL at 21:20

## 2024-07-28 RX ADMIN — TRAMADOL HYDROCHLORIDE 100 MG: 50 TABLET ORAL at 08:26

## 2024-07-28 RX ADMIN — POLYETHYLENE GLYCOL 3350 17 G: 17 POWDER, FOR SOLUTION ORAL at 08:26

## 2024-07-28 RX ADMIN — GABAPENTIN 100 MG: 100 CAPSULE ORAL at 21:20

## 2024-07-28 RX ADMIN — HYDROCODONE BITARTRATE AND ACETAMINOPHEN 1 TABLET: 5; 325 TABLET ORAL at 21:20

## 2024-07-28 NOTE — PROGRESS NOTES
[facility]     Progress Note    Patient Name: Latrell Canela  : 1950  MRN: [id number]  Primary Care Physician:  Nita Farmer MD  Date of admission: 2024    Subjective   Subjective     Chief Complaint: anemia     History of Present Illness  Patient Reports no black or blood stools,  His are feels better.     Review of Systems   Musculoskeletal:  Positive for arthralgias and joint swelling.   All other systems reviewed and are negative.      Objective   Objective     Vitals:   Temp:  [97.5 °F (36.4 °C)-97.9 °F (36.6 °C)] 97.7 °F (36.5 °C)  Heart Rate:  [69-74] 69  Resp:  [16] 16  BP: (107-134)/(58-90) 107/58    Physical Exam     Result Review    Result Review:  I have personally reviewed the results from the time of this admission to 2024 16:11 EDT and agree with these findings:  []  Laboratory list / accordion  []  Microbiology  []  Radiology  []  EKG/Telemetry   []  Cardiology/Vascular   []  Pathology  []  Old records  []  Other:  Most notable findings include:       Assessment & Plan   Assessment / Plan     Brief Patient Summary:  Latrell Canela is a 74 y.o. male who   Anemia of chronic disease     Active Hospital Problems:  Active Hospital Problems    Diagnosis     **Dizziness      Plan: No acute indication for gastrointestinal endoscopy at this time  Happy to see him as needed - [facility] picks up coverage 7.29       VTE Prophylaxis:  No VTE prophylaxis order currently exists.        CODE STATUS:       Disposition:  I expect patient to be discharged uncertain  .    Trell Miles MD

## 2024-07-28 NOTE — PROGRESS NOTES
[facility]     Progress Note    Patient Name: Latrell Canela  : 1950  MRN: [id number]  Primary Care Physician:  Nita Farmer MD  Date of admission: 2024    Subjective   Subjective     Chief Complaint:     Swelling of right forearm with redness .     History of Present Illness  Patient Reports having cellulitis of RUE x 3 weeks. He had been treated with oral and IV antibiotics. Later he developed some necrotic areas on the skin for which he was following with the [facility]. He was started on Doxycycline  100 mg BID and recommended MRI since there was possibility of deep tissue infection. He has completed the MRI today.   Patient denies fever or chills. There is no history of fall or blunt trauma to the arm. He is immune compromised and on immune suppressants for Rheumatoid arthritis. 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   He also has Peripheral polyneuropathy of both upper and lower extremities. He has lower extremity radicular neuropathy from lumbar spinal stenosis.     Review of Systems  Constitutional:  Positive for activity change and fatigue. Negative for appetite change, chills, diaphoresis and fever.   HENT: Negative.     Eyes: Negative.    Respiratory:  Negative for cough, chest tightness, shortness of breath and wheezing.    Cardiovascular:  Negative for chest pain and palpitations.   Gastrointestinal:  Positive for constipation, nausea and vomiting. Negative for abdominal distention, abdominal pain, anal bleeding, blood in stool and diarrhea.   Endocrine: Negative.    Genitourinary: Negative.    Musculoskeletal:  Positive for back pain and gait problem.        Has BKA of RLE   Skin:  Positive for color change and rash.   Allergic/Immunologic: Positive for immunocompromised state.   Neurological:  Positive for dizziness and numbness. Negative for tremors, seizures, facial asymmetry, speech difficulty, weakness, light-headedness and headaches.    Psychiatric/Behavioral:  Negative for behavioral problems, confusion and dysphoric mood. The patient is hyperactive.         History of ADD     MEDICATIONS :  aspirin, 81 mg, Oral, Daily  ferrous sulfate, 325 mg, Oral, Daily With Breakfast  folic acid, 1 mg, Oral, Daily  gabapentin, 100 mg, Oral, TID  HYDROcodone-acetaminophen, 1 tablet, Oral, Nightly  lisinopril (PRINIVIL,ZESTRIL) 20 mg, hydroCHLOROthiazide 12.5 mg for ZESTORETIC 20-12.5, , Oral, Q24H  multivitamin, 1 tablet, Oral, Daily  polyethylene glycol, 17 g, Oral, Daily  rosuvastatin, 10 mg, Oral, Nightly  traMADol, 100 mg, Oral, TID  vancomycin, 1,000 mg, Intravenous, Q12H          Objective   Objective     Vitals:   Temp:  [97.5 °F (36.4 °C)-97.9 °F (36.6 °C)] 97.7 °F (36.5 °C)  Heart Rate:  [69-74] 69  Resp:  [16] 16  BP: (107-134)/(58-90) 107/58    Physical Exam    Appearance: Normal appearance.   HENT:      Head: Normocephalic and atraumatic.      Nose: Nose normal.      Mouth/Throat:      Mouth: Mucous membranes are moist.   Eyes:      Pupils: Pupils are equal, round, and reactive to light.   Cardiovascular:      Rate and Rhythm: Normal rate and regular rhythm.      Pulses: Normal pulses.      Heart sounds: Normal heart sounds.   Pulmonary:      Effort: Pulmonary effort is normal.      Breath sounds: Normal breath sounds. No rhonchi or rales.   Chest:      Chest wall: No tenderness.   Abdominal:      General: Abdomen is flat. There is no distension.      Palpations: Abdomen is soft.      Tenderness: There is no abdominal tenderness. There is no rebound.   Musculoskeletal:      Cervical back: Normal range of motion and neck supple.      Left lower leg: Edema present.      Comments: RLE - BKBRIANDA   Neurological:      Mental Status: He is alert.   Psychiatric:         Behavior: Behavior normal.   Result Review    Result Review:  I have personally reviewed the results from the time of this admission to 7/28/2024 19:12 EDT and agree with these findings:     Laboratory list / accordion  [x][]  Microbiology  [x]  Radiology  []  EKG/Telemetry   []  Cardiology/Vascular   []  Pathology  []  Old records  []  Other:  Most notable findings include:   MRSA Screen, PCR (Inpatient) - Swab, Nares  - MRSA Detected.     WBC 8.89 3.40 - 10.80 10*3/mm3   RBC 2.97 Low  4.14 - 5.80 10*6/mm3   Hemoglobin 8.8 Low  13.0 - 17.7 g/dL   Hematocrit 26.5 Low  37.5 - 51.0 %   MCV 89.2 79.0 - 97.0 fL   MCH 29.6 26.6 - 33.0 pg   MCHC 33.2 31.5 - 35.7 g/dL   RDW 15.8 High  12.3 - 15.4 %   RDW-SD 48.1 37.0 - 54.0 fl   MPV 9.0 6.0 - 12.0 fL   Platelets 744 High  140 - 450 10*3/mm3   Neutrophil % 76.2 High  42.7 - 76.0 %   Lymphocyte % 6.9 Low  19.6 - 45.3 %   Monocyte % 12.8 High  5.0 - 12.0 %   Eosinophil % 1.8 0.3 - 6.2 %   Basophil % 0.7 0.0 - 1.5 %   Immature Grans % 1.6 High  0.0 - 0.5 %   Neutrophils, Absolute 6.78 1.70 - 7.00 10*3/mm3   Lymphocytes, Absolute 0.61 Low  0.70 - 3.10 10*3/mm3   Monocytes, Absolute 1.14 High  0.10 - 0.90 10*3/mm3   Eosinophils, Absolute 0.16 0.00 - 0.40 10*3/mm3   Basophils, Absolute 0.06 0.00 - 0.20 10*3/mm3   Immature Grans, Absolute 0.14 High  0.00 - 0.05 10*3/mm3   nRBC 0.0 0.0 - 0.2 /100 WBC     Reticulocyte % 1.49 0.70 - 1.90 %   Reticulocyte Absolute 0.0453      Assessment & Plan   Assessment / Plan     Brief Patient Summary:  Latrell Canela is a 74 y.o. male who  was admitted to hospital with complaints of nausea and vomiting since last evening. He had dizziness earlier today and hence came to hospital for further care. He had started taking Doxycycline 100 mg BID started at the [facility].   He has been having cellulitis of RUE x 3 weeks. He had been treated with oral and IV antibiotics. In spite of this he later developed 2-3 necrotic spots on the skin with persistent redness and induration for which he was following with the [facility].   Patient denies fever or chills. There is no history of fall or blunt trauma to the arm. He is immune compromised and on immune  suppressants for Rheumatoid arthritis. 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   Currently patient still has brawny induration of the dorsal aspect of RUE with swelling extending down to dorsum of the right hand. Redness is down from last week. But swelling and induration persists with 2-3 black , necrotic spots on the arm. Clinically patient does not appear to be toxic.   However MRI of the right forearm was done to check further for deep tissue infection . He was started on IV Vancomycin and requested Hand surgeon consult. The consult was reviewed.   Had long discussion with Dr. Del Rio , who feels it will be worthwhile to do biopsy of the inflamed skin for possible atypical infections vs vasculitis due to autoimmune process vs malignancy.   Mr. Canela also has Peripheral polyneuropathy of both upper and lower extremities. He also has lower extremity radiculopathy from lumbar spinal stenosis.   Patient has been admitted for dizziness , nausea and vomiting . Most likely from Doxycycline and possible food poisoning . The above symptoms has resolved. .   Patient has long history of anemia of chronic disease and is on oral iron for several years. He is found to have low H&H. His Hb has dropped from 11.4 gm from April to 8.6 now. No history of melena or BRBPR. He had colonoscopy earlier part of this year for the 5 year follow up from colon polyp.  Decrease in H&H recently could be secondary to infection or hemolytic process however the reticulocyte count seem to be in normal range.      Active Hospital Problems:  Active Hospital Problems    Diagnosis     **Dizziness      Plan:   Brawny induration and swelling of Rt forearm dorsal aspect of 3 weeks duration. Earlier US of RUE had not shown any deep fluid collection. Now with persistent symptoms and persisting brawny induration with spots of skin necrosis,  MRI of the Right forearm was checked to rule out deep seated  infection. The report of this is pending at this time. He was also started on IV vancomycin and ID and surgical consult was sort . Discussed with ID consultant Dr. Del Rio. His recommendations is appreciated. Will request the surgery team for biopsy from multiple sites from the right forearm lesion for Gm stain and cultures including for AFB and fungal , histopathology for vasculitis or granulomatous lesions or malignancy    Dizziness with nausea and vomiting : likely from Doxycycline that he was started on   recently vs Food poisoning . With IV fluids and symptomatic treatment of N&V , he is feeling better.   Elevated troponin : no chest pain or prior history of CAD. Will monitor Troponin.   Chronic Anemia with recent drop in H&H in the last 6-8 weeks. ? Secondary to infections or hemolytic process from Rheumatoid .   HTN : On Lisinopril/HCTZ . Continue  the medications.   Rheumatoid arthritis : continue the medications as recommended by dr. Sung .    VTE Prophylaxis:  No VTE prophylaxis order currently exists.        CODE STATUS:  Full code       Nita Farmer MD

## 2024-07-28 NOTE — CONSULTS
CONSULT NOTE    Infectious Diseases - Jessica Galarza MD  [facility]       Patient Identification:  Name: Latrell Canela  Age: 74 y.o.  Sex: male  :  1950  MRN: [id number]             Date of Consultation: 2024      Primary Care Physician: Nita Farmre MD                               Requesting Physician: Dr. Farmer  Reason for Consultation: Persistent inflammation of the right arm despite antibiotic therapy not showing necrotic changes.    History of presenting illness: Patient is a 74-year-old male who has complicated past medical history remarkable for chronic back pain with spinal stenosis, neuropathy, rheumatoid arthritis, hypertension was in his usual state of his health until few months ago when he started to become fatigued and tired and was sleeping a lot.  In that background patient developed swelling pain and redness of the right forearm about 3 weeks ago which was treated with courses of antibiotic therapy and in fact was hospitalized on 2024 to 7/10/2024 and was treated with Rocephin and followed by discharged with cefuroxime.  Patient finished the treatment but his right arm continued to remain inflamed and then started showing areas of necrosis and wound on the arm for which patient was sent to wound care clinic and was started on doxycycline which she took last dose yesterday.  In this background patient developed nausea vomiting and dizziness resulting in coming to the emergency room late last night early morning today and had extensive workup including CT scan of the chest PE protocol as he has some shortness of breath.  He also had CT scan of the head for dizziness which did not show any acute intracranial abnormality.  Chest x-ray also did not show any acute infiltrate.  MRI of his right forearm is ordered and patient has been empirically started on vancomycin and infectious disease service consulted.  Patient has prior history of right BKA and uses prosthesis.   Patient complains of extreme fatigue and tiredness is improved compared to how it was few months ago and is not sleeping 14 hours a day anymore.  Patient denies any fever chills or night sweats but admits to have some decrease in appetite.  Patient's activities include cutting the grass but denies any gardening or injury to his arm and also walking his dog.  He denies any recent mulching,  spelunking, swimming in fresh water etc.  Impression:  1-persistent progressive inflammation of the right forearm despite seemingly appropriate courses of antibiotic therapy consisting of Rocephin followed by oral cephalosporin followed by doxycycline and now currently on vancomycin with MRI of the forearm pending to rule out any fluid collection-this presentation of the right forearm progressive localized inflammation with necrotic areas while on appropriate antibiotic therapy in the setting of immunocompromise status due to underlying rheumatoid arthritis and its treatment is concerning for:   -Atypical infections such as environmental nontuberculous mycobacterial pathogen versus   -Fungal infection according due to local trauma while performing outside activities with inoculation from environmental source   -Vasculitis due to rheumatoid arthritis autoimmune complex versus sarcoidosis versus   -Malignancy.  2-recent symptoms of fatigue and increased tiredness and sleeping more than 14 hours with improvement now with dizziness and element of forgetfulness-etiology could very well be underlying systemic illness and a spontaneous recovery versus chronic infection versus metabolic insufficiency due to antibiotic therapy and not eating and hydrating himself.  3-rheumatoid arthritis  4-neuropathy  5-history of back pain with spinal stenosis and radiculopathy  6-history of right BKA  7-anemia and thrombocytosis.    Recommendations/Discussions:  At this juncture he does not appear to be septic.  Not unreasonable to start him on IV  vancomycin, check MRSA screen, to be complete perform blood cultures even though antibiotics have already been initiated but the most important issue is to get tissue diagnosis of this ongoing inflammation of the right forearm not responding to common antibiotic therapy for most of the cellulitis that encounter in this situation.  I think the best course of action would be to get hand surgery or general surgery service consult and get the biopsy of the forearm that should include transition from normal skin to involved inflamed skin and also biopsy of the necrotic wound especially the new one that is developing on the ulnar aspect of the distal wrist of the right forearm.  Each of these multiple site biopsy specimen should be sent for:  1-microbiological studies including Gram stain and culture, anaerobic culture, AFB and fungal culture and smear  2-histopathology for history of tissue architecture and to see whether specific inflammation pattern can be identified such as vasculitis, granulomatous inflammation or presence of foreign body as well as to perform special stain for AFB and fungal pathogens.  Management of other issues such as dizziness and fatigue per primary team.  Thank you Dr. Farmer for letting me be the part of your patient care please see above impression and recommendations.  Overall concept of care discussed with Dr. Farmer over the phone.            Past Medical History:  Past Medical History:   Diagnosis Date    Abnormal ECG     Anemia     Hyperlipidemia     Hypertension     Neurogenic claudication due to lumbar spinal stenosis     Neuropathy     LLE    Rheumatoid arthritis      Past Surgical History:  Past Surgical History:   Procedure Laterality Date    AMPUTATION Right     R BKA      Home Meds:  Medications Prior to Admission   Medication Sig Dispense Refill Last Dose    aspirin 81 MG EC tablet Take 1 tablet by mouth Daily.   7/26/2024    doxycycline (VIBRAMYCIN) 100 MG capsule Take 1 capsule  by mouth 2 (Two) Times a Day. Take for 10 days, Pt reports has take for 2 days   7/26/2024    folic acid (FOLVITE) 1 MG tablet Take  by mouth Daily.  3 7/26/2024    gabapentin (NEURONTIN) 300 MG capsule Take 1 capsule by mouth 3 times a day.   7/26/2024    HYDROcodone-acetaminophen (NORCO) 5-325 MG per tablet Take 1 tablet by mouth every night at bedtime.   7/26/2024    IRON CR PO Take  by mouth.   7/26/2024    lisinopril-hydrochlorothiazide (PRINZIDE,ZESTORETIC) 20-12.5 MG per tablet Take 1 tablet by mouth Daily.   7/26/2024    methotrexate 2.5 MG tablet TK 6 TS PO 1 TIME A WK WF  5 7/26/2024    Multiple Vitamin (MULTI-DAY PO) Take  by mouth.   7/26/2024    rosuvastatin (CRESTOR) 10 MG tablet Take 1 tablet by mouth every night at bedtime.   7/26/2024    traMADol HCl (ULTRAM) 100 MG tablet Take 1 tablet by mouth 3 times a day.   7/26/2024    Xeljanz XR 11 MG tablet sustained-release 24 hour Take 1 tablet by mouth Daily.   7/26/2024     Current Meds:     Current Facility-Administered Medications:     aspirin chewable tablet 81 mg, 81 mg, Oral, Daily, Nita Farmer MD, 81 mg at 07/27/24 1511    bisacodyl (DULCOLAX) EC tablet 10 mg, 10 mg, Oral, Once, Nita Farmer MD    ferrous sulfate tablet 325 mg, 325 mg, Oral, Daily With Breakfast, Nita Farmer MD    folic acid (FOLVITE) tablet 1 mg, 1 mg, Oral, Daily, Nita Farmer MD, 1 mg at 07/27/24 1511    gabapentin (NEURONTIN) capsule 100 mg, 100 mg, Oral, TID, Nita Farmer MD    HYDROcodone-acetaminophen (NORCO) 5-325 MG per tablet 1 tablet, 1 tablet, Oral, Nightly, Nita Farmer MD    lisinopril (PRINIVIL,ZESTRIL) 20 mg, hydroCHLOROthiazide 12.5 mg for ZESTORETIC 20-12.5, , Oral, Q24H, Nita Farmer MD, Given at 07/27/24 1511    multivitamin (THERAGRAN) tablet 1 tablet, 1 tablet, Oral, Daily, Nita Farmer MD, 1 tablet at 07/27/24 1511    Pharmacy to dose vancomycin, , Does not apply, Q12H, Nita Farmer MD    polyethylene glycol (MIRALAX) packet 17 g, 17 g, Oral, Daily, Draian,  "MD Nita    rosuvastatin (CRESTOR) tablet 10 mg, 10 mg, Oral, Nightly, Nita Farmer MD    Insert Peripheral IV, , , Once **AND** sodium chloride 0.9 % flush 10 mL, 10 mL, Intravenous, PRN, Lakshmi Box APRN    traMADol (ULTRAM) tablet 100 mg, 100 mg, Oral, TID, Nita Farmer MD, 100 mg at 07/27/24 1511    [START ON 7/28/2024] vancomycin (VANCOCIN) 1,000 mg in sodium chloride 0.9 % 250 mL IVPB-VTB, 1,000 mg, Intravenous, Q12H, Nita Farmer MD    vancomycin 1750 mg/500 mL 0.9% NS IVPB ([facility]), 20 mg/kg, Intravenous, Once, Nita Farmer MD  Allergies:  No Known Allergies  Social History:   Social History     Tobacco Use    Smoking status: Former     Types: Cigarettes    Smokeless tobacco: Never    Tobacco comments:     when was teen   Substance Use Topics    Alcohol use: No      Family History:  Family History   Problem Relation Age of Onset    Heart disease Mother           Review of Systems  See history of present illness and past medical history.  Patient denies headache, dizziness, syncope, falls, trauma, change in vision, change in hearing, change in taste, changes in weight, changes in appetite, focal weakness, numbness, or paresthesia.  Patient denies chest pain, palpitations, dyspnea, orthopnea, PND, cough, sinus pressure, rhinorrhea, epistaxis, hemoptysis, nausea, vomiting,hematemesis, diarrhea, constipation or hematchezia.  Denies cold or heat intolerance, polydipsia, polyuria, polyphagia. Denies hematuria, pyuria, dysuria, hesitancy, frequency or urgency. Denies consumption of raw and under cooked meats foods or change in water source.  Denies fever, chills, sweats, night sweats.  Denies missing any routine medications. Remainder of ROS is negative.      Vitals:   /75 (BP Location: Right arm, Patient Position: Lying)   Pulse 70   Temp 97.5 °F (36.4 °C) (Oral)   Resp 16   Ht 190.5 cm (75\")   Wt 88.7 kg (195 lb 8.8 oz)   SpO2 98%   BMI 24.44 kg/m²   I/O:   Intake/Output Summary (Last 24 hours) at " 7/27/2024 2054  Last data filed at 7/27/2024 1729  Gross per 24 hour   Intake 360 ml   Output --   Net 360 ml     Exam:  Patient is examined using the personal protective equipment as per guidelines from infection control for this particular patient as enacted.  Hand washing was performed before and after patient interaction.  General Appearance:  Alert cooperative male does not appear to be in any acute distress appears slightly emaciated   Head:    Normocephalic, without obvious abnormality, atraumatic   Eyes:    PERRL, conjunctivae/corneas clear, EOM's intact, both eyes   Ears:    Normal external ear canals, both ears   Nose:   Nares normal, septum midline, mucosa normal, no drainage    or sinus tenderness   Throat:   Lips, tongue, gums normal; oral mucosa pink and moist   Neck: Supple and no adenopathy noted   Back:     Symmetric, no curvature, ROM normal, no CVA tenderness   Lungs:     Clear to auscultation bilaterally, respirations unlabored   Chest Wall:    No tenderness or deformity    Heart:  S1-S2 regular   Abdomen:   Soft nontender   Extremities: Right BKA, swelling and mildly warm inflammation on the right forearm extensor aspect sparing the elbow and the wrist with areas of necrotic scabbing wound with intact range of motion of the wrist and elbow.  There is a new inflammatory wound appearing on the distal ulnar aspect of the right wrist.   Pulses:   Pulses palpable in all extremities; symmetric all extremities   Skin: Inflammatory changes involving the right forearm as described   Neurologic: Awake oriented x 3 sluggish at times but does not appear confused but does admit to being forgetful.       Data Review:    I reviewed the patient's new clinical results.  Results from last 7 days   Lab Units 07/27/24  0115   WBC 10*3/mm3 9.13   HEMOGLOBIN g/dL 8.6*   PLATELETS 10*3/mm3 702*     Results from last 7 days   Lab Units 07/27/24  0115   SODIUM mmol/L 132*   POTASSIUM mmol/L 3.6   CHLORIDE mmol/L 93*    CO2 mmol/L 24.0   BUN mg/dL 20   CREATININE mg/dL 0.84   CALCIUM mg/dL 9.1   GLUCOSE mg/dL 97     Microbiology Results (last 10 days)       ** No results found for the last 240 hours. **          CT Head Without Contrast    Result Date: 7/27/2024  Electronically signed by Speedy Cerrato MD on 07-27-24 at 0320    CT Angiogram Chest Pulmonary Embolism    Result Date: 7/27/2024  Electronically signed by Speedy Cerrato MD on 07-27-24 at 0316    XR Chest 1 View    Result Date: 7/27/2024  Electronically signed by Shellie Shaffer MD on 07-27-24 at 0223       Assessment:  Active Hospital Problems    Diagnosis  POA    **Dizziness [R42]  Yes      Resolved Hospital Problems   No resolved problems to display.         Plan:  See above  Jessica Gustafson MD   7/27/2024  20:54 EDT    Parts of this note may be an electronic transcription/translation of spoken language to printed text using the Dragon dictation system.

## 2024-07-28 NOTE — PROGRESS NOTES
"  Infectious Diseases Progress Note    Jessica Gustafson MD     [facility]  [place]: 0 days  Patient Identification:  Name: Latrell Canela  Age: 74 y.o.  Sex: male  :  1950  MRN: [id number]         Primary Care Physician: Nita Farmer MD        Subjective: Feeling somewhat better with decreased pain and discomfort and improvement in range of motion of his wrist and elbow and decrease in the tightness of the right forearm.  Interval History: See consultation note.    Objective:    Scheduled Meds:aspirin, 81 mg, Oral, Daily  ferrous sulfate, 325 mg, Oral, Daily With Breakfast  folic acid, 1 mg, Oral, Daily  gabapentin, 100 mg, Oral, TID  HYDROcodone-acetaminophen, 1 tablet, Oral, Nightly  lisinopril (PRINIVIL,ZESTRIL) 20 mg, hydroCHLOROthiazide 12.5 mg for ZESTORETIC 20-12.5, , Oral, Q24H  multivitamin, 1 tablet, Oral, Daily  Pharmacy to dose vancomycin, , Does not apply, Q12H  polyethylene glycol, 17 g, Oral, Daily  rosuvastatin, 10 mg, Oral, Nightly  traMADol, 100 mg, Oral, TID  vancomycin, 1,000 mg, Intravenous, Q12H      Continuous Infusions:     Vital signs in last 24 hours:  Temp:  [97.5 °F (36.4 °C)-97.9 °F (36.6 °C)] 97.7 °F (36.5 °C)  Heart Rate:  [69-74] 69  Resp:  [16] 16  BP: (107-134)/(58-90) 107/58    Intake/Output:    Intake/Output Summary (Last 24 hours) at 2024 1850  Last data filed at 2024 0946  Gross per 24 hour   Intake 2410 ml   Output --   Net 2410 ml       Exam:  /58 (BP Location: Left arm, Patient Position: Lying)   Pulse 69   Temp 97.7 °F (36.5 °C) (Oral)   Resp 16   Ht 190.5 cm (75\")   Wt 88.7 kg (195 lb 8.8 oz)   SpO2 97%   BMI 24.44 kg/m²   Patient is examined using the personal protective equipment as per guidelines from infection control for this particular patient as enacted.  Hand washing was performed before and after patient interaction.  General Appearance:  Alert and cooperative.                          Head:    Normocephalic, without obvious " abnormality, atraumatic                           Eyes:    PERRL, conjunctivae/corneas clear, EOM's intact, both eyes                         Throat:   Lips, tongue, gums normal; oral mucosa pink and moist                           Neck:   Supple, symmetrical, trachea midline, no JVD                         Lungs:    Clear to auscultation bilaterally, respirations unlabored                 Chest Wall:    No tenderness or deformity                          Heart:    Regular rate and rhythm, S1 and S2 normal, no murmur, no rub                                         or gallop                  Abdomen:   Soft nontender                 Extremities: Induration and erythema and overall appearance of the extensor aspect of the right forearm is slightly improved compared to 24 hours ago                        Pulses:   Pulses palpable in all extremities                            Skin: Lesions on the right forearm drying out.                  Neurologic: Alert and oriented x 3 but gets frustrated easily and showing evidence of some cognitive decline.       Data Review:    I reviewed the patient's new clinical results.  Results from last 7 days   Lab Units 07/28/24  0518 07/27/24  0115   WBC 10*3/mm3 8.89 9.13   HEMOGLOBIN g/dL 8.8* 8.6*   PLATELETS 10*3/mm3 744* 702*     Results from last 7 days   Lab Units 07/28/24  0518 07/27/24  0115   SODIUM mmol/L 134* 132*   POTASSIUM mmol/L 3.8 3.6   CHLORIDE mmol/L 101 93*   CO2 mmol/L 25.9 24.0   BUN mg/dL 13 20   CREATININE mg/dL 0.79 0.84   CALCIUM mg/dL 8.8 9.1   GLUCOSE mg/dL 99 97     Microbiology Results (last 10 days)       Procedure Component Value - Date/Time    MRSA Screen, PCR (Inpatient) - Swab, Nares [[id number]]  (Abnormal) Collected: 07/28/24 1011    Lab Status: Final result Specimen: Swab from Nares Updated: 07/28/24 1249     MRSA PCR MRSA Detected    Narrative:      The negative predictive value of this diagnostic test is high and should only be used to consider  de-escalating anti-MRSA therapy. A positive result may indicate colonization with MRSA and must be correlated clinically.              Assessment:    Dizziness  1-persistent progressive inflammation of the right forearm despite seemingly appropriate courses of antibiotic therapy consisting of Rocephin followed by oral cephalosporin followed by doxycycline and now currently on vancomycin with MRI of the forearm pending to rule out any fluid collection-this presentation of the right forearm progressive localized inflammation with necrotic areas while on appropriate antibiotic therapy in the setting of immunocompromise status due to underlying rheumatoid arthritis and its treatment is concerning for:             -Atypical infections such as environmental nontuberculous mycobacterial pathogen versus             -Fungal infection according due to local trauma while performing outside activities with inoculation from environmental source             -Vasculitis due to rheumatoid arthritis autoimmune complex versus sarcoidosis versus             -Malignancy.   -Ongoing low-level soft tissue infection with MRSA colonization.  2-recent symptoms of fatigue and increased tiredness and sleeping more than 14 hours with improvement now with dizziness and element of forgetfulness-etiology could very well be underlying systemic illness and a spontaneous recovery versus chronic infection versus metabolic insufficiency due to antibiotic therapy and not eating and hydrating himself.  3-rheumatoid arthritis  4-neuropathy  5-history of back pain with spinal stenosis and radiculopathy  6-history of right BKA  7-anemia and thrombocytosis.  8-MRSA colonization     Recommendations/Discussions:  Discussed with patient and Dr. Farmer about importance of elevation of right upper extremity, continued movement and activity of hand and wrist and elbow to ensure decrease in swelling while on antibiotic therapy with IV vancomycin to be  continued.  Hand surgery service evaluation noted and it appears that the reason for hand surgery consultation was not properly communicated as the idea was to get biopsy of these abnormal areas on the right forearm rather than the course for debridement was the reason for consultation.  Because of MRSA screen and some improvement on IV vancomycin I think we can provide him with a trial of anti-MRSA treatment for soft tissue infection with elevation and local care and work on his hand function and arm movement to see if this is improved and resolved.  If abnormality seen on the soft tissue of right forearm extensor sugars persist despite these intervention then biopsy would be the next step to sorted out for possibilities as noted in the initial consult.  Management of underlying cognitive decline per primary team.  Jessica Gustafson MD  7/28/2024  18:50 EDT    Parts of this note may be an electronic transcription/translation of spoken language to printed text using the Dragon dictation system.

## 2024-07-28 NOTE — PLAN OF CARE
Goal Outcome Evaluation:  Plan of Care Reviewed With: patient        Progress: no change  Outcome Evaluation: Pt sleeping at this time, comfortable, received hs meds including narcs and IV vancomycin. MRI completed, ativan 0.5mg given once per his request prior to mRI. vss. sb on tele, took all hs snacks, R arm remians edematous, with scabs, skin is brownish.  r arm elevated on pillow, tooks hs meds  and hs snacks. sr - sb on tele , 95% on room air.

## 2024-07-28 NOTE — PROGRESS NOTES
"[facility]: Vancomycin Monitoring Note    Latrell Canela is a 74 y.o. male who is on day 2/7 of pharmacy to dose vancomycin for Skin and Soft Tissue.    Previous Vancomycin Dose:    1750 mg IV x1 on 7/28 at 0130  Updated Cultures and Sensitivities: 7/28 B/C x 2  in process                                                                 7/28 MRSA in process      Vitals/Labs  Ht: 190.5 cm (75\"); Wt: 88.7 kg (195 lb 8.8 oz)   Temp Readings from Last 1 Encounters:   07/28/24 97.9 °F (36.6 °C) (Oral)     Estimated Creatinine Clearance: 106 mL/min (by C-G formula based on SCr of 0.79 mg/dL).        Results from last 7 days   Lab Units 07/28/24  0518 07/27/24  0115   CREATININE mg/dL 0.79 0.84   WBC 10*3/mm3 8.89 9.13     Assessment/Plan    Current Vancomycin Dose: 1000 mg IV every  12  hours; provides a predicted  mg/L.hr   Next Level Date and Time: Vanc Trough on 7/29 at 0730  We will continue to monitor patient changes and renal function     Thank you for involving pharmacy in this patient's care. Please contact pharmacy with any questions or concerns.       Ольга Sanchez, [facility]  Clinical Pharmacist          "

## 2024-07-28 NOTE — PLAN OF CARE
Pt oriented forgetful at times, vs stable. MRSA swab positive contact isolation. R forearm 2-3+ edema. Pt reports was going to wound clinic will consult wound nurse to see.    Problem: Adult Inpatient Plan of Care  Goal: Plan of Care Review  Outcome: Ongoing, Progressing  Goal: Patient-Specific Goal (Individualized)  Outcome: Ongoing, Progressing  Goal: Absence of Hospital-Acquired Illness or Injury  Outcome: Ongoing, Progressing  Intervention: Identify and Manage Fall Risk  Recent Flowsheet Documentation  Taken 7/28/2024 1400 by Lizbeth Zamora RN  Safety Promotion/Fall Prevention:   activity supervised   assistive device/personal items within reach   clutter free environment maintained   fall prevention program maintained   nonskid shoes/slippers when out of bed   room organization consistent   safety round/check completed  Taken 7/28/2024 0825 by Lizbeth Zamora RN  Safety Promotion/Fall Prevention:   activity supervised   assistive device/personal items within reach   clutter free environment maintained   fall prevention program maintained   nonskid shoes/slippers when out of bed   room organization consistent   safety round/check completed  Intervention: Prevent Skin Injury  Recent Flowsheet Documentation  Taken 7/28/2024 1800 by Lizbeth Zamora RN  Body Position: position changed independently  Taken 7/28/2024 1600 by Lizbeth Zamora RN  Body Position: position changed independently  Taken 7/28/2024 1400 by Lizbeth Zamora RN  Body Position: position changed independently  Taken 7/28/2024 1200 by Lizbeth Zamora RN  Body Position: position changed independently  Taken 7/28/2024 1000 by Lizbeth Zamora RN  Body Position: position changed independently  Taken 7/28/2024 0825 by Lizbeth Zamora RN  Body Position: position changed independently  Intervention: Prevent and Manage VTE (Venous Thromboembolism) Risk  Recent Flowsheet Documentation  Taken 7/28/2024 1400 by Lizbeth Zamora RN  Activity  Management: activity encouraged  Taken 7/28/2024 1000 by Lizbeth Zamora RN  Activity Management: activity encouraged  Taken 7/28/2024 0825 by Lizbeth Zamora RN  Activity Management: activity encouraged  Range of Motion: active ROM (range of motion) encouraged  Intervention: Prevent Infection  Recent Flowsheet Documentation  Taken 7/28/2024 1400 by Lizbeth Zamora RN  Infection Prevention:   single patient room provided   rest/sleep promoted  Taken 7/28/2024 0825 by Lizbeth Zamora RN  Infection Prevention:   single patient room provided   rest/sleep promoted  Goal: Optimal Comfort and Wellbeing  Outcome: Ongoing, Progressing  Intervention: Provide Person-Centered Care  Recent Flowsheet Documentation  Taken 7/28/2024 1400 by Lizbeth Zamora RN  Trust Relationship/Rapport: care explained  Taken 7/28/2024 0825 by Lizbeth Zamora RN  Trust Relationship/Rapport:   care explained   thoughts/feelings acknowledged   reassurance provided   questions encouraged   questions answered   emotional support provided  Goal: Readiness for Transition of Care  Outcome: Ongoing, Progressing     Problem: Fall Injury Risk  Goal: Absence of Fall and Fall-Related Injury  Outcome: Ongoing, Progressing  Intervention: Identify and Manage Contributors  Recent Flowsheet Documentation  Taken 7/28/2024 1400 by Lizbeth Zamora RN  Medication Review/Management: medications reviewed  Self-Care Promotion:   independence encouraged   BADL personal objects within reach   BADL personal routines maintained  Taken 7/28/2024 0825 by Lizbeth Zamora RN  Medication Review/Management: medications reviewed  Self-Care Promotion:   independence encouraged   BADL personal objects within reach   BADL personal routines maintained  Intervention: Promote Injury-Free Environment  Recent Flowsheet Documentation  Taken 7/28/2024 1400 by Lizbeth Zamora RN  Safety Promotion/Fall Prevention:   activity supervised   assistive device/personal items within reach    clutter free environment maintained   fall prevention program maintained   nonskid shoes/slippers when out of bed   room organization consistent   safety round/check completed  Taken 7/28/2024 0825 by Lizbeth Zamora RN  Safety Promotion/Fall Prevention:   activity supervised   assistive device/personal items within reach   clutter free environment maintained   fall prevention program maintained   nonskid shoes/slippers when out of bed   room organization consistent   safety round/check completed     Problem: Pain Chronic (Persistent)  Goal: Acceptable Pain Control and Functional Ability  Outcome: Ongoing, Progressing  Intervention: Manage Persistent Pain  Recent Flowsheet Documentation  Taken 7/28/2024 1400 by Lizbeth Zamora RN  Medication Review/Management: medications reviewed  Taken 7/28/2024 0825 by Lizbeth Zamora RN  Medication Review/Management: medications reviewed  Intervention: Optimize Psychosocial Wellbeing  Recent Flowsheet Documentation  Taken 7/28/2024 1400 by Lizbeth Zamora RN  Diversional Activities:   smartphone   television  Family/Support System Care:   self-care encouraged   presence promoted  Taken 7/28/2024 0825 by Lizbeth Zamora RN  Diversional Activities:   television   smartphone  Family/Support System Care:   self-care encouraged   presence promoted     Problem: Skin or Soft Tissue Infection  Goal: Absence of Infection Signs and Symptoms  Outcome: Ongoing, Progressing  Intervention: Minimize and Manage Infection Progression  Recent Flowsheet Documentation  Taken 7/28/2024 1400 by Lizbeth Zamora RN  Infection Prevention:   single patient room provided   rest/sleep promoted  Isolation Precautions:   precautions initiated   contact  Taken 7/28/2024 0825 by Lizbeth Zamora RN  Infection Prevention:   single patient room provided   rest/sleep promoted     Problem: Hypertension Comorbidity  Goal: Blood Pressure in Desired Range  Outcome: Ongoing, Progressing  Intervention: Maintain  Blood Pressure Management  Recent Flowsheet Documentation  Taken 7/28/2024 1400 by Lizbeth Zamora, RN  Medication Review/Management: medications reviewed  Taken 7/28/2024 0825 by Lizbeth Zamora, RN  Medication Review/Management: medications reviewed   Goal Outcome Evaluation:

## 2024-07-28 NOTE — CONSULTS
Orthopaedic Consult    Annabelle Mccormick MD      Patient: Latrell Canela    Date of Admission: 7/27/2024  1:06 AM    YOB: 1950    Medical Record Number: [id number]    Attending Physician: Nita Farmer MD  Consulting Physician: Annabelle Mccormick MD      Chief Complaints: Dizziness [R42]  Anemia, unspecified type [D64.9]      History of Present Illness: 74 y.o. male admitted to [facility] with Dizziness [R42]  Anemia, unspecified type [D64.9].  Orthopedic surgery was consulted for evaluation of his right forearm wound.  He reports his been present for 4 weeks.  He was previously hospitalized for course of IV antibiotics and he was discharged home and it did get worse.  Overall he feels generalized malaise since he was admitted he does report that he feels better.  He did get an MRI of his form completed yesterday.  Hand surgery was asked to evaluate the patient.  He has full range of motion of his elbow and wrist.  He does have a history of a right below-knee amputation.  He reports he is not diabetic but does have neuropathy.  Patient is left-hand-dominant    Allergies: No Known Allergies    Medications:   Home Medications:  Medications Prior to Admission   Medication Sig Dispense Refill Last Dose    aspirin 81 MG EC tablet Take 1 tablet by mouth Daily.   7/26/2024    doxycycline (VIBRAMYCIN) 100 MG capsule Take 1 capsule by mouth 2 (Two) Times a Day. Take for 10 days, Pt reports has take for 2 days   7/26/2024    folic acid (FOLVITE) 1 MG tablet Take  by mouth Daily.  3 7/26/2024    gabapentin (NEURONTIN) 300 MG capsule Take 1 capsule by mouth 3 times a day.   7/26/2024    HYDROcodone-acetaminophen (NORCO) 5-325 MG per tablet Take 1 tablet by mouth every night at bedtime.   7/26/2024    IRON CR PO Take  by mouth.   7/26/2024    lisinopril-hydrochlorothiazide (PRINZIDE,ZESTORETIC) 20-12.5 MG per tablet Take 1 tablet by mouth Daily.   7/26/2024    methotrexate 2.5 MG tablet TK 6 TS PO 1 TIME A  WK WF  5 7/26/2024    Multiple Vitamin (MULTI-DAY PO) Take  by mouth.   7/26/2024    rosuvastatin (CRESTOR) 10 MG tablet Take 1 tablet by mouth every night at bedtime.   7/26/2024    traMADol HCl (ULTRAM) 100 MG tablet Take 1 tablet by mouth 3 times a day.   7/26/2024    Xeljanz XR 11 MG tablet sustained-release 24 hour Take 1 tablet by mouth Daily.   7/26/2024       Current Medications:  Scheduled Meds:aspirin, 81 mg, Oral, Daily  ferrous sulfate, 325 mg, Oral, Daily With Breakfast  folic acid, 1 mg, Oral, Daily  gabapentin, 100 mg, Oral, TID  HYDROcodone-acetaminophen, 1 tablet, Oral, Nightly  lisinopril (PRINIVIL,ZESTRIL) 20 mg, hydroCHLOROthiazide 12.5 mg for ZESTORETIC 20-12.5, , Oral, Q24H  multivitamin, 1 tablet, Oral, Daily  Pharmacy to dose vancomycin, , Does not apply, Q12H  polyethylene glycol, 17 g, Oral, Daily  rosuvastatin, 10 mg, Oral, Nightly  traMADol, 100 mg, Oral, TID  vancomycin, 1,000 mg, Intravenous, Q12H      Continuous Infusions:   PRN Meds:.  Insert Peripheral IV **AND** sodium chloride    Past Medical History:   Diagnosis Date    Abnormal ECG     Anemia     Hyperlipidemia     Hypertension     Neurogenic claudication due to lumbar spinal stenosis     Neuropathy     LLE    Rheumatoid arthritis      Past Surgical History:   Procedure Laterality Date    AMPUTATION Right     R BKA     Social History     Occupational History    Not on file   Tobacco Use    Smoking status: Former     Types: Cigarettes    Smokeless tobacco: Never    Tobacco comments:     when was teen   Vaping Use    Vaping status: Never Used   Substance and Sexual Activity    Alcohol use: No    Drug use: No    Sexual activity: Defer      Social History     Social History Narrative    Not on file     Family History   Problem Relation Age of Onset    Heart disease Mother          Review of Systems:   Constitutional: Negative for fatigue, fever, or weight loss  HEENT: Patient denies any headaches, vision changes, sore throat. Admits  to wearing glasses  Pulmonary: Patient denies any cough, congestion, SOA, or wheezing  Cardiovascular: Patient denies any chest pain, palpitations, weakness,  Gastrointestinal:  Patient denies nausea, vomiting, diarrhea, constipation  Genital/Urinary: Patient denies dysuria, urinary frequency, urgency, incontinence, retention  Musculoskeletal: Positive for right forearm pain. Negative for muscle cramps  Neurological: Patient denies dizziness, paresthesia, loss of sensation, seizures, syncope  Endocrine system: Patient denies tremors, cold or heat intolerance  Psychological: Patient denies thoughts/plans or harming self or other; hallucinations,  insomnia  Skin: Patient denies any bruising, rashes, lesions, or ulcers   Hematopoietic: Patient denies history of MRSA or slow wound healing,  spontaneous or excessive bleeding    Physical Exam: 74 y.o. male  Vitals:    07/27/24 1538 07/27/24 1922 07/28/24 0115 07/28/24 0729   BP: 111/74 129/75 134/90 117/64   BP Location: Right arm Right arm Right arm Right arm   Patient Position: Lying Lying Lying Lying   Pulse: 67 70 74 72   Resp: 16 16 16 16   Temp: 97.9 °F (36.6 °C) 97.5 °F (36.4 °C) 97.9 °F (36.6 °C) 97.9 °F (36.6 °C)   TempSrc: Oral Oral Oral Oral   SpO2: 96% 98% 93% 97%   Weight:       Height:                                General Appearance:  Alert, cooperative, in no acute distress    HEENT:    Normocephalic,  Atraumatic, Pupils are equal   Neck:   No adenopathy, supple, trachea midline, no thyromegaly       Lungs:     Clear to auscultation, equal expansion bilaterally, respirations regular, even and               unlabored    Heart:    Regular rhythm and normal rate, normal S1 and S2, no murmur, no gallops   Chest Wall:    Within normal limits   Abdomen:     Normal bowel sounds, no masses,  soft non-tender, non-distended,       Rectal:  Musculoskeletal:     Deferred  Exam of the right upper extremity demonstrates that there is draining wound superficially  consistent with severe cellulitis on his right forearm.  He has no tenderness in the hand wrist or elbow and has full range of motion.  Neuro vas intact median/radial nerve.   Extremities:   No clubbing, no edema, no cyanosis   Pulses  Neurovascular:   Pulses palpable and equal bilaterally in all 4 extremities    Patient was alert and oriented x 3 spheres   Skin:   No lesions, ulcers or rashes   Neurologic:   Cranial nerves 2 - 12 grossly intact, sensation intact            Diagnostic Tests:      I reviewed the MRI that was performed last night.  There is not yet a formal read.  But to me it does appear that all of the inflammation and swelling is in the superficial soft tissues consistent with a severe cellulitis.  I see no bony involvement.    Assessment:    Dizziness    Right forearm cellulitis        Plan:      I do not believe he needs any surgical intervention at this time.  I will continue IV antibiotics per primary.  Hand surgery will sign off at this time.  If have any additional concerns please reconsult.  Thank you for this consult.    Date: 7/28/2024  Annabelle Mccormick MD  Orthopaedic Surgeon    [facility]  [phone]  [email or web address]

## 2024-07-29 LAB
ANION GAP SERPL CALCULATED.3IONS-SCNC: 11 MMOL/L (ref 5–15)
BUN SERPL-MCNC: 15 MG/DL (ref 8–23)
BUN/CREAT SERPL: 18.8 (ref 7–25)
CALCIUM SPEC-SCNC: 8.7 MG/DL (ref 8.6–10.5)
CHLORIDE SERPL-SCNC: 101 MMOL/L (ref 98–107)
CO2 SERPL-SCNC: 24 MMOL/L (ref 22–29)
CREAT SERPL-MCNC: 0.8 MG/DL (ref 0.76–1.27)
EGFRCR SERPLBLD CKD-EPI 2021: 94 ML/MIN/1.73
GLUCOSE SERPL-MCNC: 91 MG/DL (ref 65–99)
POTASSIUM SERPL-SCNC: 3.9 MMOL/L (ref 3.5–5.2)
SODIUM SERPL-SCNC: 136 MMOL/L (ref 136–145)
VANCOMYCIN TROUGH SERPL-MCNC: 11 MCG/ML (ref 5–20)

## 2024-07-29 PROCEDURE — 80202 ASSAY OF VANCOMYCIN: CPT | Performed by: INTERNAL MEDICINE

## 2024-07-29 PROCEDURE — 25010000002 VANCOMYCIN 1 G RECONSTITUTED SOLUTION 1 EACH VIAL: Performed by: INTERNAL MEDICINE

## 2024-07-29 PROCEDURE — 25810000003 SODIUM CHLORIDE 0.9 % SOLUTION 250 ML FLEX CONT: Performed by: INTERNAL MEDICINE

## 2024-07-29 PROCEDURE — 80048 BASIC METABOLIC PNL TOTAL CA: CPT | Performed by: INTERNAL MEDICINE

## 2024-07-29 PROCEDURE — 25010000002 VANCOMYCIN HCL 1.25 G RECONSTITUTED SOLUTION 1 EACH VIAL: Performed by: INTERNAL MEDICINE

## 2024-07-29 RX ADMIN — TRAMADOL HYDROCHLORIDE 100 MG: 50 TABLET ORAL at 17:42

## 2024-07-29 RX ADMIN — LISINOPRIL: 20 TABLET ORAL at 12:37

## 2024-07-29 RX ADMIN — VANCOMYCIN HYDROCHLORIDE 1250 MG: 1.25 INJECTION, POWDER, LYOPHILIZED, FOR SOLUTION INTRAVENOUS at 21:17

## 2024-07-29 RX ADMIN — FERROUS SULFATE TAB 325 MG (65 MG ELEMENTAL FE) 325 MG: 325 (65 FE) TAB at 12:37

## 2024-07-29 RX ADMIN — SODIUM CHLORIDE 1000 MG: 9 INJECTION, SOLUTION INTRAVENOUS at 10:17

## 2024-07-29 RX ADMIN — Medication 1 TABLET: at 12:37

## 2024-07-29 RX ADMIN — MUPIROCIN 1 APPLICATION: 20 OINTMENT TOPICAL at 12:38

## 2024-07-29 RX ADMIN — FOLIC ACID 1 MG: 1 TABLET ORAL at 12:37

## 2024-07-29 RX ADMIN — MUPIROCIN 1 APPLICATION: 20 OINTMENT TOPICAL at 21:17

## 2024-07-29 RX ADMIN — HYDROCODONE BITARTRATE AND ACETAMINOPHEN 1 TABLET: 5; 325 TABLET ORAL at 21:16

## 2024-07-29 RX ADMIN — ASPIRIN 81 MG: 81 TABLET, CHEWABLE ORAL at 10:18

## 2024-07-29 RX ADMIN — Medication: at 08:00

## 2024-07-29 RX ADMIN — GABAPENTIN 100 MG: 100 CAPSULE ORAL at 10:18

## 2024-07-29 RX ADMIN — TRAMADOL HYDROCHLORIDE 100 MG: 50 TABLET ORAL at 10:17

## 2024-07-29 RX ADMIN — TRAMADOL HYDROCHLORIDE 100 MG: 50 TABLET ORAL at 21:16

## 2024-07-29 RX ADMIN — POLYETHYLENE GLYCOL 3350 17 G: 17 POWDER, FOR SOLUTION ORAL at 10:18

## 2024-07-29 RX ADMIN — GABAPENTIN 100 MG: 100 CAPSULE ORAL at 21:16

## 2024-07-29 RX ADMIN — GABAPENTIN 100 MG: 100 CAPSULE ORAL at 17:42

## 2024-07-29 NOTE — CASE MANAGEMENT/SOCIAL WORK
Discharge Planning Assessment  [facility]     Patient Name: Latrell Canela  MRN: [id number]  Today's Date: 7/29/2024    Admit Date: 7/27/2024    Plan: Plan home.   MARC Wills RN   Discharge Needs Assessment       Row Name 07/29/24 0959       Living Environment    People in Home alone    Current Living Arrangements home    Potentially Unsafe Housing Conditions none    In the past 12 months has the electric, gas, oil, or water company threatened to shut off services in your home? No    Primary Care Provided by self    Provides Primary Care For no one    Family Caregiver if Needed friend(s)    Quality of Family Relationships supportive    Able to Return to Prior Arrangements yes    Living Arrangement Comments Pt lives alone in a single story house.       Resource/Environmental Concerns    Resource/Environmental Concerns none    Transportation Concerns none       Transportation Needs    In the past 12 months, has lack of transportation kept you from medical appointments or from getting medications? no    In the past 12 months, has lack of transportation kept you from meetings, work, or from getting things needed for daily living? No       Food Insecurity    Within the past 12 months, you worried that your food would run out before you got the money to buy more. Never true    Within the past 12 months, the food you bought just didn't last and you didn't have money to get more. Never true       Transition Planning    Patient/Family Anticipates Transition to home    Patient/Family Anticipated Services at Transition none    Transportation Anticipated family or friend will provide       Discharge Needs Assessment    Readmission Within the Last 30 Days no previous admission in last 30 days    Equipment Currently Used at Home grab bar;prosthesis;shower chair;wheelchair    Concerns to be Addressed no discharge needs identified;denies needs/concerns at this time    Anticipated Changes Related to Illness none    Equipment  Needed After Discharge grab bar, tub/shower;prosthesis;shower chair;wheelchair, manual                   Discharge Plan       Row Name 07/29/24 1001       Plan    Plan Plan home.   MARC Wills RN    Patient/Family in Agreement with Plan yes    Plan Comments FACE SHEET VERIFIED/ IM LETTER SIGNED. Spoke with pt at bedside. Pt's PCP is Dr. Nita Farmer.  Pt lives alone in a single story house. Pt is independent with ADLs. Pt has a grab bar, prosthesis, shower chair and wheelchair for home use if needed. Pt gets his prescriptions at [employer]  ([place] [facility]). Pt denies any issues affording medications. Pt is not current with .  Pt has not been in SNF. Pt denies any discharge needs.  Pt states his friend and neighbor (Macho Muñoz) will assist him at home if needed and will transport him home. Plan home. MARC Wills RN                  Continued Care and Services - Admitted Since 7/27/2024    No active coordination exists for this encounter.       Expected Discharge Date and Time       Expected Discharge Date Expected Discharge Time    Jul 31, 2024            Demographic Summary       Row Name 07/29/24 0958       General Information    Admission Type inpatient    Arrived From emergency department    Required Notices Provided Important Message from Medicare    Referral Source admission list    Reason for Consult discharge planning    Preferred Language English                   Functional Status       Row Name 07/29/24 0958       Functional Status    Usual Activity Tolerance good    Current Activity Tolerance good       Functional Status, IADL    Medications independent    Meal Preparation independent    Housekeeping independent    Laundry independent    Shopping independent       Mental Status    General Appearance WDL WDL                   Psychosocial    No documentation.                  Abuse/Neglect    No documentation.                  Legal    No documentation.                  Substance Abuse     No documentation.                  Patient Forms    No documentation.                     Christine Wills, RN

## 2024-07-29 NOTE — PLAN OF CARE
Goal Outcome Evaluation:  Plan of Care Reviewed With: patient        Progress: no change  Outcome Evaluation: Pt up et about in room, using rt leg prosthesis, no c/o pain but is on scheduled pain meds, vanc iv given, appetite well, voiding well, bm x5 per pt, refused mralax. sr on tele, room air sattin hi 90's. r arm elevated with a sling. Gen barton consulted, Dr. Bowles called, will be here tomorrow to see pt.

## 2024-07-29 NOTE — PROGRESS NOTES
"[facility] Clinical Pharmacy Services: Vancomycin Monitoring Note    Latrell Canela is a 74 y.o. male who is on day 2/7 of pharmacy to dose vancomycin for Skin and Soft Tissue.    Previous Vancomycin Dose:    1750 mg IV x1 on 7/28 at 0130  Updated Cultures and Sensitivities: 7/28 B/C x 2  in process                                                                 7/28 MRSA in process  Results from last 7 days   Lab Units 07/29/24  0748   VANCOMYCIN TR mcg/mL 11.00     Vitals/Labs  Ht: 190.5 cm (75\"); Wt: 88.7 kg (195 lb 8.8 oz)   Temp Readings from Last 1 Encounters:   07/29/24 97.7 °F (36.5 °C) (Oral)     Estimated Creatinine Clearance: 104.7 mL/min (by C-G formula based on SCr of 0.8 mg/dL).        Results from last 7 days   Lab Units 07/29/24  0748 07/28/24  0518 07/27/24  0115   CREATININE mg/dL 0.80 0.79 0.84   WBC 10*3/mm3  --  8.89 9.13     Assessment/Plan    Current Vancomycin Dose: Current regimen provides and AUC of 404, which is still in therapeutic range but in very low end. Will increase dose to 1250 mg IV every  12  hours; provides a predicted  mg/L.hr   Next Level Date and Time: Vanc Trough on 7/30 at 0730  We will continue to monitor patient changes and renal function     Thank you for involving pharmacy in this patient's care. Please contact pharmacy with any questions or concerns.       Jb Holly [facility]  Clinical Pharmacist            "

## 2024-07-29 NOTE — CASE MANAGEMENT/SOCIAL WORK
Continued Stay Note  [facility]     Patient Name: Latrell Canela  MRN: [id number]  Today's Date: 7/29/2024    Admit Date: 7/27/2024    Plan: Plan home.   MARC Wills RN   Discharge Plan       Row Name 07/29/24 1001       Plan    Plan Plan home.   MARC Wills RN    Patient/Family in Agreement with Plan yes    Plan Comments FACE SHEET VERIFIED/ IM LETTER SIGNED. Spoke with pt at bedside. Pt's PCP is Dr. Nita Farmer.  Pt lives alone in a single story house. Pt is independent with ADLs. Pt has a grab bar, prosthesis, shower chair and wheelchair for home use if needed. Pt gets his prescriptions at [facility]  ([place] [facility] Pt denies any issues affording medications. Pt is not current with .  Pt has not been in SNF. Pt denies any discharge needs.  Pt states his friend and neighbor (Macho Muñoz) will assist him at home if needed and will transport him home. Plan home. MARC Wills RN                   Discharge Codes    No documentation.                 Expected Discharge Date and Time       Expected Discharge Date Expected Discharge Time    Jul 31, 2024               Christine Wills RN

## 2024-07-29 NOTE — CONSULTS
Consult received for biopsy of persistent right arm induration.  Will plan for biopsy on 7/30/2024 at that site with multiple punch biopsies.  Counseled patient on risk of wound complications, nonhealing wounds, worsening infection.

## 2024-07-29 NOTE — PROGRESS NOTES
[facility]     Progress Note    Patient Name: Latrell Canela  : 1950  MRN: [id number]  Primary Care Physician:  Nita Farmer MD  Date of admission: 2024    Subjective   Subjective     Chief Complaint:     Swelling of right forearm with redness .   History of Present Illness  Patient Reports  having cellulitis of RUE x 3 weeks. He had been treated with oral and IV antibiotics. Later he developed some necrotic areas on the skin for which he was following with the [facility]. He was started on Doxycycline  100 mg BID and recommended MRI since there was possibility of deep tissue infection. He has completed the MRI.   Patient denies fever or chills. There is no history of fall or blunt trauma to the arm. He is immune compromised and on immune suppressants for Rheumatoid arthritis. 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   He also has Peripheral polyneuropathy of both upper and lower extremities. 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 Negative for appetite change, chills, diaphoresis and fever.   HENT: Negative.     Eyes: Negative.    Respiratory:  Negative for cough, chest tightness, shortness of breath and wheezing.    Cardiovascular:  Negative for chest pain and palpitations.   Gastrointestinal:  Positive for constipation, nausea and vomiting. Negative for abdominal distention, abdominal pain, anal bleeding, blood in stool and diarrhea.   Endocrine: Negative.    Genitourinary: Negative.    Musculoskeletal:  Positive for back pain and gait problem.        Has BKA of RLE   Skin:  Positive for color change and rash.   Allergic/Immunologic: Positive for immunocompromised state.   Neurological:  Positive for dizziness and numbness. Negative for tremors, seizures, facial asymmetry, speech difficulty, weakness, light-headedness and headaches.    Psychiatric/Behavioral:  Negative for behavioral problems, confusion and dysphoric mood. The patient is hyperactive.         History of ADD      MEDICATIONS :  aspirin, 81 mg, Oral, Daily  ferrous sulfate, 325 mg, Oral, Daily With Breakfast  folic acid, 1 mg, Oral, Daily  gabapentin, 100 mg, Oral, TID  HYDROcodone-acetaminophen, 1 tablet, Oral, Nightly  lisinopril (PRINIVIL,ZESTRIL) 20 mg, hydroCHLOROthiazide 12.5 mg for ZESTORETIC 20-12.5, , Oral, Q24H  multivitamin, 1 tablet, Oral, Daily  polyethylene glycol, 17 g, Oral, Daily  rosuvastatin, 10 mg, Oral, Nightly  traMADol, 100 mg, Oral, TID  vancomycin, 1,000 mg, Intravenous, Q12H     Objective   Objective     Vitals:   Temp:  [97.7 °F (36.5 °C)-98.3 °F (36.8 °C)] 97.7 °F (36.5 °C)  Heart Rate:  [65-66] 65  Resp:  [16] 16  BP: (111-120)/(66-70) 111/66    Physical Exam   Physical Exam             Appearance: Normal appearance.   HENT:      Head: Normocephalic and atraumatic.      Nose: Nose normal.      Mouth/Throat:      Mouth: Mucous membranes are moist.   Eyes:      Pupils: Pupils are equal, round, and reactive to light.   Cardiovascular:      Rate and Rhythm: Normal rate and regular rhythm.      Pulses: Normal pulses.      Heart sounds: Normal heart sounds.   Pulmonary:      Effort: Pulmonary effort is normal.      Breath sounds: Normal breath sounds. No rhonchi or rales.   Chest:      Chest wall: No tenderness.   Abdominal:      General: Abdomen is flat. There is no distension.      Palpations: Abdomen is soft.      Tenderness: There is no abdominal tenderness. There is no rebound.   Musculoskeletal:      Cervical back: Normal range of motion and neck supple.      Left lower leg: Edema present.      Comments: RLE - BKBRIANDA   Neurological:      Mental Status: He is alert.   Psychiatric:         Behavior: Behavior normal.       Result Review:  I have personally reviewed the results from the time of this admission to 7/29/2024 19:11 EDT and agree with these  findings:  [x]  Laboratory list / accordion  [x]  Microbiology  [x]  Radiology  []  EKG/Telemetry   []  Cardiology/Vascular   []  Pathology  []  Old records  []  Other:  Most notable findings include:   Blood Culture x 2 No growth at 24 hours P          Glucose 91 mg/dL CO2 24.0 mmol/L   BUN 15 mg/dL Calcium 8.7 mg/dL   Creatinine 0.80 mg/dL BUN/Creatinine Ratio 18.8   Sodium 136 mmol/L Anion Gap 11.0 mmol/L   Potassium 3.9 mmol/L eGFR 94.0 mL/min/1.73   Chloride 101 mmol/L          MRI : Rt Forearm :   diffuse subcutaneous soft tissue edema and   edema throughout the soft tissues and muscles of the deep and   superficial dorsal compartments of the forearm and the radial group   forearm muscles, which could represent cellulitis and myositis, with   partially imaged extensor carpi ulnaris tenosynovitis. Recommend   clinical exclusion of compartment syndrome. No well-formed or drainable   fluid collection is seen to suggest abscess.     Assessment & Plan   Assessment / Plan     Brief Patient Summary:  Latrell Canela is a 74 y.o. male who   was admitted to hospital with complaints of nausea and vomiting since last evening. He had dizziness earlier today and hence came to hospital for further care. He had started taking Doxycycline 100 mg BID started at the [facility].   He has been having cellulitis of RUE x 3 weeks. He had been treated with oral and IV antibiotics. In spite of this he later developed 2-3 necrotic spots on the skin with persistent redness and induration for which he was following with the [facility].   Patient denies fever or chills. There is no history of fall or blunt trauma to the arm. He is immune compromised and on immune suppressants for Rheumatoid arthritis. 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   Currently patient still has brawny induration of the dorsal aspect of RUE with swelling extending down to dorsum of the right hand. Redness is down from last week.  But swelling and induration persists with 2-3 black , necrotic spots on the arm. Clinically patient does not appear to be toxic.   However MRI of the right forearm was done to check further for deep tissue infection . He was started on IV Vancomycin and requested Hand surgeon consult. The consult was reviewed.   Had long discussion with Dr. Del Rio , who feels it will be worthwhile to do biopsy of the inflamed skin for possible atypical infections vs vasculitis due to autoimmune process vs malignancy.   Mr. Canela also has Peripheral polyneuropathy of both upper and lower extremities. He also has lower extremity radiculopathy from lumbar spinal stenosis.   Patient has been admitted for dizziness , nausea and vomiting . Most likely from Doxycycline and possible food poisoning . The above symptoms has resolved. .   Patient has long history of anemia of chronic disease and is on oral iron for several years. He is found to have low H&H. His Hb has dropped from 11.4 gm from April to 8.6 now. No history of melena or BRBPR. He had colonoscopy earlier part of this year for the 5 year follow up from colon polyp.  Decrease in H&H recently could be secondary to infection or hemolytic process however the reticulocyte count seem to be in normal range.      Active Hospital Problems:  Active Hospital Problems    Diagnosis     **Dizziness      Plan:   Brawny induration and swelling of Rt forearm dorsal aspect of 3 weeks duration. Earlier US of RUE had not shown any deep fluid collection. Now with persistent symptoms and persisting brawny induration with spots of skin necrosis,  MRI of the Right forearm was checked to rule out deep seated infection. The report of this is noted . He was also started on IV vancomycin and ID and surgical consult was sort . Discussed with ID consultant Dr. Del Rio. His recommendations is appreciated. Will request the surgery team for biopsy from multiple sites from the right forearm lesion for Gm stain and  cultures including for AFB and fungal , histopathology for vasculitis or granulomatous lesions or malignancy    Dizziness with nausea and vomiting : likely from Doxycycline that he was started on   recently vs Food poisoning . With IV fluids and symptomatic treatment of N&V , he is feeling better.   Elevated troponin : no chest pain or prior history of CAD. Will monitor Troponin.   Chronic Anemia with recent drop in H&H in the last 6-8 weeks. ? Secondary to infections or hemolytic process from Rheumatoid . Will evaluate further once the right arm status improves.   HTN : On Lisinopril/HCTZ . Continue  the medications.   Rheumatoid arthritis : continue the medications as recommended by dr. Sung .    VTE Prophylaxis:  No VTE prophylaxis order currently exists.        CODE STATUS:       Disposition:  I expect patient to be discharged in about 2 days .     Nita Farmer MD

## 2024-07-29 NOTE — NURSING NOTE
Wound/Ostomy: We see the patient by floor nurse request regarding skin issue on rt Forearm. Upon assessment is observed swollen appearance, induration, redness, tender to touch, full-thickness small skin and tissue loss x 5, necrotic tissue and/or scab, Cellulitis x 3 wk according to the patient. He is already on IV antibiotics, Bactroban topical is added.  Wound care order and nursing intervention have been implemented into Epic.  Please re-consult for any additional needs.

## 2024-07-29 NOTE — PLAN OF CARE
Goal Outcome Evaluation:         Pt. Alert, oriented x4, v/s stable, ATB IV therapy done as ordered, Pt. Ambulatory , used bathroom self, with swollen to right arm remained, skin color of right arm remained, arm sling ordered, arm sling in his room, v/s stable, 02 sats kept over 90% on room air, no s/s acute distress noted

## 2024-07-30 VITALS
SYSTOLIC BLOOD PRESSURE: 110 MMHG | RESPIRATION RATE: 16 BRPM | OXYGEN SATURATION: 92 % | TEMPERATURE: 98 F | BODY MASS INDEX: 24.31 KG/M2 | DIASTOLIC BLOOD PRESSURE: 69 MMHG | WEIGHT: 195.55 LBS | HEART RATE: 63 BPM | HEIGHT: 75 IN

## 2024-07-30 LAB
ANION GAP SERPL CALCULATED.3IONS-SCNC: 10 MMOL/L (ref 5–15)
BUN SERPL-MCNC: 16 MG/DL (ref 8–23)
BUN/CREAT SERPL: 21.3 (ref 7–25)
CALCIUM SPEC-SCNC: 8.9 MG/DL (ref 8.6–10.5)
CHLORIDE SERPL-SCNC: 101 MMOL/L (ref 98–107)
CO2 SERPL-SCNC: 25 MMOL/L (ref 22–29)
CREAT SERPL-MCNC: 0.75 MG/DL (ref 0.76–1.27)
EGFRCR SERPLBLD CKD-EPI 2021: 95.9 ML/MIN/1.73
GLUCOSE SERPL-MCNC: 91 MG/DL (ref 65–99)
POTASSIUM SERPL-SCNC: 3.9 MMOL/L (ref 3.5–5.2)
SODIUM SERPL-SCNC: 136 MMOL/L (ref 136–145)
VANCOMYCIN TROUGH SERPL-MCNC: 12.7 MCG/ML (ref 5–20)

## 2024-07-30 PROCEDURE — 88341 IMHCHEM/IMCYTCHM EA ADD ANTB: CPT

## 2024-07-30 PROCEDURE — 99222 1ST HOSP IP/OBS MODERATE 55: CPT | Performed by: STUDENT IN AN ORGANIZED HEALTH CARE EDUCATION/TRAINING PROGRAM

## 2024-07-30 PROCEDURE — 88342 IMHCHEM/IMCYTCHM 1ST ANTB: CPT | Performed by: STUDENT IN AN ORGANIZED HEALTH CARE EDUCATION/TRAINING PROGRAM

## 2024-07-30 PROCEDURE — 80048 BASIC METABOLIC PNL TOTAL CA: CPT | Performed by: INTERNAL MEDICINE

## 2024-07-30 PROCEDURE — 11105 PUNCH BX SKIN EA SEP/ADDL: CPT | Performed by: STUDENT IN AN ORGANIZED HEALTH CARE EDUCATION/TRAINING PROGRAM

## 2024-07-30 PROCEDURE — 87102 FUNGUS ISOLATION CULTURE: CPT | Performed by: STUDENT IN AN ORGANIZED HEALTH CARE EDUCATION/TRAINING PROGRAM

## 2024-07-30 PROCEDURE — 87116 MYCOBACTERIA CULTURE: CPT | Performed by: STUDENT IN AN ORGANIZED HEALTH CARE EDUCATION/TRAINING PROGRAM

## 2024-07-30 PROCEDURE — 88312 SPECIAL STAINS GROUP 1: CPT | Performed by: STUDENT IN AN ORGANIZED HEALTH CARE EDUCATION/TRAINING PROGRAM

## 2024-07-30 PROCEDURE — 80202 ASSAY OF VANCOMYCIN: CPT | Performed by: INTERNAL MEDICINE

## 2024-07-30 PROCEDURE — 0HBBXZX EXCISION OF RIGHT UPPER ARM SKIN, EXTERNAL APPROACH, DIAGNOSTIC: ICD-10-PCS | Performed by: STUDENT IN AN ORGANIZED HEALTH CARE EDUCATION/TRAINING PROGRAM

## 2024-07-30 PROCEDURE — 87176 TISSUE HOMOGENIZATION CULTR: CPT | Performed by: STUDENT IN AN ORGANIZED HEALTH CARE EDUCATION/TRAINING PROGRAM

## 2024-07-30 PROCEDURE — 25010000002 VANCOMYCIN HCL 1.25 G RECONSTITUTED SOLUTION 1 EACH VIAL: Performed by: INTERNAL MEDICINE

## 2024-07-30 PROCEDURE — 88312 SPECIAL STAINS GROUP 1: CPT

## 2024-07-30 PROCEDURE — 87205 SMEAR GRAM STAIN: CPT | Performed by: STUDENT IN AN ORGANIZED HEALTH CARE EDUCATION/TRAINING PROGRAM

## 2024-07-30 PROCEDURE — 11104 PUNCH BX SKIN SINGLE LESION: CPT | Performed by: STUDENT IN AN ORGANIZED HEALTH CARE EDUCATION/TRAINING PROGRAM

## 2024-07-30 PROCEDURE — 87206 SMEAR FLUORESCENT/ACID STAI: CPT | Performed by: STUDENT IN AN ORGANIZED HEALTH CARE EDUCATION/TRAINING PROGRAM

## 2024-07-30 PROCEDURE — 88305 TISSUE EXAM BY PATHOLOGIST: CPT | Performed by: STUDENT IN AN ORGANIZED HEALTH CARE EDUCATION/TRAINING PROGRAM

## 2024-07-30 PROCEDURE — 87070 CULTURE OTHR SPECIMN AEROBIC: CPT | Performed by: STUDENT IN AN ORGANIZED HEALTH CARE EDUCATION/TRAINING PROGRAM

## 2024-07-30 PROCEDURE — 25810000003 SODIUM CHLORIDE 0.9 % SOLUTION 250 ML FLEX CONT: Performed by: INTERNAL MEDICINE

## 2024-07-30 RX ORDER — LINEZOLID 600 MG/1
600 TABLET, FILM COATED ORAL EVERY 12 HOURS SCHEDULED
Qty: 20 TABLET | Refills: 0 | Status: SHIPPED | OUTPATIENT
Start: 2024-07-31 | End: 2024-08-10

## 2024-07-30 RX ORDER — LIDOCAINE HYDROCHLORIDE AND EPINEPHRINE 10; 10 MG/ML; UG/ML
10 INJECTION, SOLUTION INFILTRATION; PERINEURAL ONCE
Status: COMPLETED | OUTPATIENT
Start: 2024-07-30 | End: 2024-07-30

## 2024-07-30 RX ORDER — LINEZOLID 600 MG/1
600 TABLET, FILM COATED ORAL EVERY 12 HOURS SCHEDULED
Status: DISCONTINUED | OUTPATIENT
Start: 2024-07-31 | End: 2024-07-30 | Stop reason: HOSPADM

## 2024-07-30 RX ADMIN — ASPIRIN 81 MG: 81 TABLET, CHEWABLE ORAL at 09:19

## 2024-07-30 RX ADMIN — HYDROCODONE BITARTRATE AND ACETAMINOPHEN 1 TABLET: 5; 325 TABLET ORAL at 20:11

## 2024-07-30 RX ADMIN — FERROUS SULFATE TAB 325 MG (65 MG ELEMENTAL FE) 325 MG: 325 (65 FE) TAB at 09:19

## 2024-07-30 RX ADMIN — GABAPENTIN 100 MG: 100 CAPSULE ORAL at 20:11

## 2024-07-30 RX ADMIN — Medication: at 09:00

## 2024-07-30 RX ADMIN — Medication 10 ML: at 09:20

## 2024-07-30 RX ADMIN — GABAPENTIN 100 MG: 100 CAPSULE ORAL at 16:17

## 2024-07-30 RX ADMIN — Medication 1 TABLET: at 09:19

## 2024-07-30 RX ADMIN — TRAMADOL HYDROCHLORIDE 100 MG: 50 TABLET ORAL at 20:11

## 2024-07-30 RX ADMIN — MUPIROCIN 1 APPLICATION: 20 OINTMENT TOPICAL at 20:12

## 2024-07-30 RX ADMIN — ROSUVASTATIN CALCIUM 10 MG: 10 TABLET, FILM COATED ORAL at 20:11

## 2024-07-30 RX ADMIN — TRAMADOL HYDROCHLORIDE 100 MG: 50 TABLET ORAL at 16:17

## 2024-07-30 RX ADMIN — Medication: at 20:12

## 2024-07-30 RX ADMIN — VANCOMYCIN HYDROCHLORIDE 1250 MG: 1.25 INJECTION, POWDER, LYOPHILIZED, FOR SOLUTION INTRAVENOUS at 09:18

## 2024-07-30 RX ADMIN — FOLIC ACID 1 MG: 1 TABLET ORAL at 09:19

## 2024-07-30 RX ADMIN — Medication 10 ML: at 15:18

## 2024-07-30 RX ADMIN — VANCOMYCIN HYDROCHLORIDE 1250 MG: 1.25 INJECTION, POWDER, LYOPHILIZED, FOR SOLUTION INTRAVENOUS at 20:11

## 2024-07-30 RX ADMIN — GABAPENTIN 100 MG: 100 CAPSULE ORAL at 09:19

## 2024-07-30 RX ADMIN — MUPIROCIN 1 APPLICATION: 20 OINTMENT TOPICAL at 09:20

## 2024-07-30 RX ADMIN — LISINOPRIL: 20 TABLET ORAL at 09:19

## 2024-07-30 RX ADMIN — TRAMADOL HYDROCHLORIDE 100 MG: 50 TABLET ORAL at 09:19

## 2024-07-30 NOTE — PLAN OF CARE
Goal Outcome Evaluation:      Pt up in room ad dre. No c/o voiced. VSS No s/s of distress. Plan of care ongoing

## 2024-07-30 NOTE — PROGRESS NOTES
"[facility] Clinical Pharmacy Services: Vancomycin Monitoring Note    Latrell Canela is a 74 y.o. male who is on day 3/7 of pharmacy to dose vancomycin for Skin and Soft Tissue.    Previous Vancomycin Dose:    1750 mg IV x1 on 7/28 at 0130  Updated Cultures and Sensitivities: 7/28 B/C x 2  in process                                                                 7/28 MRSA in process  Results from last 7 days   Lab Units 07/30/24  0758 07/29/24  0748   VANCOMYCIN TR mcg/mL 12.70 11.00     Vitals/Labs  Ht: 190.5 cm (75\"); Wt: 88.7 kg (195 lb 8.8 oz)   Temp Readings from Last 1 Encounters:   07/30/24 98 °F (36.7 °C) (Oral)     Estimated Creatinine Clearance: 111.7 mL/min (A) (by C-G formula based on SCr of 0.75 mg/dL (L)).        Results from last 7 days   Lab Units 07/30/24  0758 07/29/24  0748 07/28/24  0518 07/27/24  0115   CREATININE mg/dL 0.75* 0.80 0.79 0.84   WBC 10*3/mm3  --   --  8.89 9.13     Assessment/Plan  Crcl stable, scr stable. Level this morning was therapeutic. Will plan to continue with current dose of 1250mg q12h   Current Vancomycin Dose: Continue Vancomycin 1250 mg IV every  12  hours; provides a predicted  mg/L.hr   Next Level Date and Time: no more levels are needed at this time, unless duration is extended or renal function changes  We will continue to monitor patient changes and renal function     Thank you for involving pharmacy in this patient's care. Please contact pharmacy with any questions or concerns.       Benjamin Wilson [facility]  Clinical Pharmacist              "

## 2024-07-30 NOTE — PROGRESS NOTES
"  Infectious Diseases Progress Note    Jessica Gustafson MD     [facility]  [place]: 1 day  Patient Identification:  Name: Latrell Canela  Age: 74 y.o.  Sex: male  :  1950  MRN: [id number]         Primary Care Physician: Nita Farmer MD        Subjective: Continues to feel that his discomfort and tightness of his right forearm is better.  Able to move his elbow and wrist with much more freedom.  Thinks that the lesions are improving and redness is getting better.  Interval History: See consultation note.  Seen by general surgery service for punch biopsies tomorrow.  Objective:    Scheduled Meds:aspirin, 81 mg, Oral, Daily  ferrous sulfate, 325 mg, Oral, Daily With Breakfast  folic acid, 1 mg, Oral, Daily  gabapentin, 100 mg, Oral, TID  HYDROcodone-acetaminophen, 1 tablet, Oral, Nightly  lisinopril (PRINIVIL,ZESTRIL) 20 mg, hydroCHLOROthiazide 12.5 mg for ZESTORETIC 20-12.5, , Oral, Q24H  multivitamin, 1 tablet, Oral, Daily  mupirocin, 1 Application, Topical, Q12H  Pharmacy to dose vancomycin, , Does not apply, Q12H  polyethylene glycol, 17 g, Oral, Daily  rosuvastatin, 10 mg, Oral, Nightly  traMADol, 100 mg, Oral, TID  vancomycin, 1,250 mg, Intravenous, Q12H      Continuous Infusions:     Vital signs in last 24 hours:  Temp:  [97.7 °F (36.5 °C)-98.1 °F (36.7 °C)] 97.7 °F (36.5 °C)  Heart Rate:  [65] 65  Resp:  [16] 16  BP: (111-120)/(66-69) 111/66    Intake/Output:    Intake/Output Summary (Last 24 hours) at 2024  Last data filed at 2024 1017  Gross per 24 hour   Intake 490 ml   Output --   Net 490 ml       Exam:  /66 (BP Location: Right arm, Patient Position: Lying)   Pulse 65   Temp 97.7 °F (36.5 °C) (Oral)   Resp 16   Ht 190.5 cm (75\")   Wt 88.7 kg (195 lb 8.8 oz)   SpO2 99%   BMI 24.44 kg/m²   Patient is examined using the personal protective equipment as per guidelines from infection control for this particular patient as enacted.  Hand washing was performed before and " after patient interaction.  General Appearance:  Alert and cooperative.                          Head:    Normocephalic, without obvious abnormality, atraumatic                           Eyes:    PERRL, conjunctivae/corneas clear, EOM's intact, both eyes                         Throat:   Lips, tongue, gums normal; oral mucosa pink and moist                           Neck:   Supple, symmetrical, trachea midline, no JVD                         Lungs:    Clear to auscultation bilaterally, respirations unlabored                 Chest Wall:    No tenderness or deformity                          Heart:    Regular rate and rhythm, S1 and S2 normal, no murmur, no rub                                         or gallop                  Abdomen:   Soft nontender                 Extremities: Induration and erythema and overall appearance of the extensor aspect of the right forearm is slightly improved compared to 24 hours ago                        Pulses:   Pulses palpable in all extremities                            Skin: Lesions on the right forearm drying out.                  Neurologic: Alert and oriented x 3 but gets frustrated easily and showing evidence of some cognitive decline.       Data Review:    I reviewed the patient's new clinical results.  Results from last 7 days   Lab Units 07/28/24  0518 07/27/24  0115   WBC 10*3/mm3 8.89 9.13   HEMOGLOBIN g/dL 8.8* 8.6*   PLATELETS 10*3/mm3 744* 702*     Results from last 7 days   Lab Units 07/29/24  0748 07/28/24  0518 07/27/24  0115   SODIUM mmol/L 136 134* 132*   POTASSIUM mmol/L 3.9 3.8 3.6   CHLORIDE mmol/L 101 101 93*   CO2 mmol/L 24.0 25.9 24.0   BUN mg/dL 15 13 20   CREATININE mg/dL 0.80 0.79 0.84   CALCIUM mg/dL 8.7 8.8 9.1   GLUCOSE mg/dL 91 99 97     Microbiology Results (last 10 days)       Procedure Component Value - Date/Time    MRSA Screen, PCR (Inpatient) - Swab, Nares [[id number]]  (Abnormal) Collected: 07/28/24 1011    Lab Status: Final result Specimen:  Swab from Nares Updated: 07/28/24 1249     MRSA PCR MRSA Detected    Narrative:      The negative predictive value of this diagnostic test is high and should only be used to consider de-escalating anti-MRSA therapy. A positive result may indicate colonization with MRSA and must be correlated clinically.    Blood Culture - Blood, Arm, Left [[id number]]  (Normal) Collected: 07/28/24 0954    Lab Status: Preliminary result Specimen: Blood from Arm, Left Updated: 07/29/24 1632     Blood Culture No growth at 24 hours    Blood Culture - Blood, Arm, Left [[id number]]  (Normal) Collected: 07/28/24 0950    Lab Status: Preliminary result Specimen: Blood from Arm, Left Updated: 07/29/24 1015     Blood Culture No growth at 24 hours              Assessment:    Dizziness  1-persistent progressive inflammation of the right forearm despite seemingly appropriate courses of antibiotic therapy consisting of Rocephin followed by oral cephalosporin followed by doxycycline and now currently on vancomycin with MRI of the forearm pending to rule out any fluid collection-this presentation of the right forearm progressive localized inflammation with necrotic areas while on appropriate antibiotic therapy in the setting of immunocompromise status due to underlying rheumatoid arthritis and its treatment is concerning for:             -Atypical infections such as environmental nontuberculous mycobacterial pathogen versus             -Fungal infection according due to local trauma while performing outside activities with inoculation from environmental source             -Vasculitis due to rheumatoid arthritis autoimmune complex versus sarcoidosis versus             -Malignancy.   -Ongoing low-level soft tissue infection with MRSA colonization.  2-recent symptoms of fatigue and increased tiredness and sleeping more than 14 hours with improvement now with dizziness and element of forgetfulness-etiology could very well be underlying systemic illness  and a spontaneous recovery versus chronic infection versus metabolic insufficiency due to antibiotic therapy and not eating and hydrating himself.  3-rheumatoid arthritis  4-neuropathy  5-history of back pain with spinal stenosis and radiculopathy  6-history of right BKA  7-anemia and thrombocytosis.  8-MRSA colonization     Recommendations/Discussions:  See my discussion and recommendation on 7/27/2024 and 7/28/2024.  Continue with arm elevation and IV vancomycin  Follow-up on punch biopsy pathology and culture results  Monitor closely for side effects of antibiotic therapy  Will switch him to oral Zyvox to complete 2 weeks of treatment for complicated soft tissue infection with MRSA while awaiting the pathology results once patient is considered ready to be discharged.  Drug interaction with Zyvox in his current regimen needs to be looked at carefully to avoid unintended side effects from the use of Zyvox.  Jessica Gustafson MD  7/29/2024  20:54 EDT    Parts of this note may be an electronic transcription/translation of spoken language to printed text using the Dragon dictation system.

## 2024-07-30 NOTE — PROCEDURES
Procedure Note :  Valentina Cooney MD    Patient Name and :  Latrell Canela  1950    Procedure Date:   24    Pre-procedure Diagnosis:  Cellulitis of right forearm of unclear etiology    Post-procedure diagnosis:  Post-Op Diagnosis Codes:  Same as above    Procedure:   Punch biopsy x 4 right forearm    Surgeon:   Valentina Cooney MD    Assistant:   None    Anesthesia:    Local (local anesthesia only) with lidocaine 1% with epinephrine    Estimated Blood Loss:   3cc    Specimens:   Punch biopsy x 2 sent for tissue pathology  Punch biopsy x2 sent for micro - AFB, anaerobic, aerobic, and fungal cultures    Complications:   None    Indications:  Mr. Canela is a 74-year-old gentleman who presented to the hospital with right forearm cellulitis has not healed following appropriate antibiotic treatment.  He has undergone imaging which demonstrates edema of the forearm without abscess or drainable fluid collection.  He was seen by infectious disease and recommended to undergo biopsy for tissue pathology and cultures.  After informed consent was obtained, he elected to proceed with the procedure.    Findings:   Punch biopsy x4 of dorsal right forearm   Specimens as above  Hemostasis following procedure    Description of procedure:  Informed consent was obtained.  A timeout was performed with the nurse Mk present.  The right forearm was prepped with chlorhexidine and draped in the usual sterile fashion.  5 cc of lidocaine 1% with epinephrine was used to create a local block around the dorsal right forearm.  A 3 mm punch biopsy was used to obtain four specimens.  These were sent to pathology for histopathology exam as well as to micro for AFB, anaerobic, aerobic, and fungal cultures.  Wounds were closed with 4-0 Monocryl sutures and hemostasis noted.  The field was cleaned and dried.  A dressing of gauze 4 x 4's and Kerlix gauze was placed.  The patient tolerated the procedure without  complications.      Valentina Cooney MD  General Surgery  [facility]    [address], [address]  [place] [address]  P: [phone]  F: [phone]

## 2024-07-30 NOTE — DISCHARGE SUMMARY
Latrell Canela  Male, 74 y.o., 1950  MRN: [id number]      Admit Date : 7/27/2024   Discharge Date : 7/30/2024      Admitting Physician : Nita Farmer MD    Consultants :     1. Infectious Disease : Jessica Gustafson MD  2. General Surgery : Valentina Cooney MD    Procedures performed.     1.MRI of the Rt Forearm .  2. Multiple Punch Biopsy of Rt forearm lesion.     Discharge Diagnosis :     1.Persistent Cellulitis of Rt Forearm with Induration possible MRSA infection .   2. Fatigue -  Recent weeks   3. Anemia of chronic disease with recent fall in H&H   4. Rheumatoid Arthritis  5. Polyneuropathy   6.Rheumatoid Arthritis  7.Lumbar radiculopathy secondary to Spinal Stenosis  8. Hypertension.    9.History or Rt Below Knee Amputation in the past.     Brief Patient Summary of Hospital Stay :  Latrell Canela is a 74 y.o. male who   was admitted to hospital with complaints of nausea and vomiting and dizziness. He had started taking Doxycycline 100 mg BID started at the [facility].   He has been having cellulitis of RUE x 3 weeks. He had been treated with oral and IV antibiotics. In spite of this he later developed 2-3 necrotic spots on the skin with persistent redness and induration for which he was following with the [facility].   Patient did not have fever or chills. There is no history of fall or blunt trauma to the arm. He is immune compromised and on immune suppressants for Rheumatoid arthritis. 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   Patient had redness and brawny induration of the dorsal aspect of RUE with swelling extending down to dorsum of the right hand. He also had necrotic spots on the arm.   However MRI of the right forearm was done to check further for deep tissue infection . He was started on IV Vancomycin and requested infectious disease consult.     Dr. Del Rio had requested for nasal swab for MRSA and this was positive. He also recommended  biopsy of the inflamed skin  for possible atypical infections vs vasculitis due to autoimmune process vs malignancy. The punch biopsy from multiple sites was done by the surgeon on 7/30/24 . The pathology and special stain for Atypical or fungal infections will be followed as out patient in office. He had received IV vancomycin q12h x 4 days. He will be started on Zyvox 600 mg BID x 10 days , Starting tomorrow. Labs WBC will be done in about 5 days.   Mr. Canela also has Peripheral polyneuropathy of both upper and lower extremities. He also has lower extremity radiculopathy from lumbar spinal stenosis.   Patient has been admitted for dizziness , nausea and vomiting . Most likely from Doxycycline and possible food poisoning . The above symptoms has resolved. .   Patient has long history of anemia of chronic disease and is on oral iron for several years. He is found to have low H&H. His Hb has dropped from 11.4 gm from April to 8.6 now. No history of melena or BRBPR. He had colonoscopy earlier part of this year for the 5 year follow up from colon polyp.  Decrease in H&H recently could be secondary to infection or hemolytic process however the reticulocyte count seem to be in normal range.      Discharge Physical Exam :     Vitals:   Temp:  [97.7 °F (36.5 °C)-98.3 °F (36.8 °C)] 97.7 °F (36.5 °C)  Heart Rate:  [65-66] 65  Resp:  [16] 16  BP: (111-120)/(66-70) 111/66              Physical Exam             Appearance: Normal appearance.   HENT:      Head: Normocephalic and atraumatic.      Nose: Nose normal.      Mouth/Throat:      Mouth: Mucous membranes are moist.   Eyes:      Pupils: Pupils are equal, round, and reactive to light.   Cardiovascular:      Rate and Rhythm: Normal rate and regular rhythm.      Pulses: Normal pulses.      Heart sounds: Normal heart sounds.   Pulmonary:      Effort: Pulmonary effort is normal.      Breath sounds: Normal breath sounds. No rhonchi or rales.   Chest:      Chest wall: No tenderness.   Abdominal:       General: Abdomen is flat. There is no distension.      Palpations: Abdomen is soft.      Tenderness: There is no abdominal tenderness. There is no rebound.   Musculoskeletal:      Cervical back: Normal range of motion and neck supple.      Left lower leg: trace Edema present.      Comments: RLE - BKA   Rt Upper extremity : Brawny induration and edema decreased by about 50 % since admission.  Redness improved by about 70%.    Neurological:      Mental Status: He is alert.   Psychiatric:         Behavior: Behavior normal.     Discharge Labs :     Glucose 91 mg/dL CO2 25.0 mmol/L   BUN 16 mg/dL Calcium 8.9 mg/dL   Creatinine 0.75 Low  mg/dL BUN/Creatinine Ratio 21.3   Sodium 136 mmol/L Anion Gap 10.0 mmol/L   Potassium 3.9 mmol/L eGFR 95.9 mL/min/1.73   Chloride 101 mmol/L         Blood Culture x 2 days no growth    Tissue Rt Forearm : Gm Stain Negative for wbc or organism.  Fungal And AFB culture - pending.     Discharge Medications :     1.aspirin, 81 mg, Oral, Daily  2.ferrous sulfate, 325 mg, Oral, Daily With Breakfast  3. folic acid, 1 mg, Oral, Daily  4. gabapentin, 100 mg, Oral, TID  5. HYDROcodone-acetaminophen, 5/325 ,1 tablet, Oral, Nightly  6.lisinopril (PRINIVIL,ZESTRIL) 20 mg, hydroCHLOROthiazide 12.5 mg for ZESTORETIC 20-12.5, , Oral, Q24H  7.multivitamin, 1 tablet, Oral, Daily  8.polyethylene glycol, 17 g, Oral, Daily  9.rosuvastatin, 10 mg, Oral, Nightly  10. traMADol, 100 mg, Oral, TID  11. Zyvox 600 mg , oral BID x 10 days.     Discharge Instructions :     1.Patient will resume his preadmission activities but he will report back to his job after   completing his oral antibiotics therapy.   2.He will continue Low sodium diet .  3. He will follow up with me in Office in about 7 days.   4. He will report to ER if there is any flare up of the lesion in his forearm .

## 2024-07-30 NOTE — CONSULTS
General Surgery Consultation    Consulting Physician: Valentina Cooney MD  Referring: Dr. Gustafson    Reason for consultation:   Right forearm biopsy    CC:   Right forearm cellulitis    HPI:   The patient is a 74 y.o. male who presented to the hospital with right forearm cellulitis present for the past 5 weeks.  He states he has had no trauma to the area.  He has never had anything like this before.  He initially had redness and swelling of the forearm and then developed some crusted over skin lesions with a small amount of drainage from them.  These have not been draining recently.  He has been admitted to the hospital and treated with antibiotics without resolution.  He underwent ultrasound on 7/9/2024 which demonstrated edema of the subcutaneous fat with cellulitis without fluid collection or abscess.  He underwent MRI of the right forearm on 7/28/2024 which demonstrated diffuse soft tissue edema, possible cellulitis or myositis, without well-formed or drainable fluid collection suggestive of abscess.  He has been seen by infectious disease and recommended to undergo punch biopsy for tissue pathology and cultures.  He is on aspirin 81 mg daily.  He also takes Xeljanz and methotrexate for rheumatoid arthritis.    Past Medical History:  Past Medical History:   Diagnosis Date    Abnormal ECG     Anemia     Hyperlipidemia     Hypertension     Neurogenic claudication due to lumbar spinal stenosis     Neuropathy     LLE    Rheumatoid arthritis        Past Surgical History:  Past Surgical History:   Procedure Laterality Date    AMPUTATION Right     R BKA       Medications:  Medications Prior to Admission   Medication Sig Dispense Refill Last Dose    aspirin 81 MG EC tablet Take 1 tablet by mouth Daily.   7/26/2024    doxycycline (VIBRAMYCIN) 100 MG capsule Take 1 capsule by mouth 2 (Two) Times a Day. Take for 10 days, Pt reports has take for 2 days   7/26/2024    folic acid (FOLVITE) 1 MG tablet Take  by mouth Daily.   3 7/26/2024    gabapentin (NEURONTIN) 300 MG capsule Take 1 capsule by mouth 3 times a day.   7/26/2024    HYDROcodone-acetaminophen (NORCO) 5-325 MG per tablet Take 1 tablet by mouth every night at bedtime.   7/26/2024    IRON CR PO Take  by mouth.   7/26/2024    lisinopril-hydrochlorothiazide (PRINZIDE,ZESTORETIC) 20-12.5 MG per tablet Take 1 tablet by mouth Daily.   7/26/2024    methotrexate 2.5 MG tablet TK 6 TS PO 1 TIME A WK WF  5 7/26/2024    Multiple Vitamin (MULTI-DAY PO) Take  by mouth.   7/26/2024    rosuvastatin (CRESTOR) 10 MG tablet Take 1 tablet by mouth every night at bedtime.   7/26/2024    traMADol HCl (ULTRAM) 100 MG tablet Take 1 tablet by mouth 3 times a day.   7/26/2024    Xeljanz XR 11 MG tablet sustained-release 24 hour Take 1 tablet by mouth Daily.   7/26/2024       Current Facility-Administered Medications:     aspirin chewable tablet 81 mg, 81 mg, Oral, Daily, Nita Farmer MD, 81 mg at 07/30/24 0919    ferrous sulfate tablet 325 mg, 325 mg, Oral, Daily With Breakfast, Nita Farmer MD, 325 mg at 07/30/24 0919    folic acid (FOLVITE) tablet 1 mg, 1 mg, Oral, Daily, Nita Farmer MD, 1 mg at 07/30/24 0919    gabapentin (NEURONTIN) capsule 100 mg, 100 mg, Oral, TID, Nita Farmer MD, 100 mg at 07/30/24 0919    HYDROcodone-acetaminophen (NORCO) 5-325 MG per tablet 1 tablet, 1 tablet, Oral, Nightly, Nita Farmer MD, 1 tablet at 07/29/24 2116    lidocaine 1% - EPINEPHrine 1:146811 (XYLOCAINE W/EPI) 1 %-1:605813 injection 10 mL, 10 mL, Injection, Once, Valentina Cooney MD    lisinopril (PRINIVIL,ZESTRIL) 20 mg, hydroCHLOROthiazide 12.5 mg for ZESTORETIC 20-12.5, , Oral, Q24H, Nita Farmer MD, Given at 07/30/24 0919    multivitamin (THERAGRAN) tablet 1 tablet, 1 tablet, Oral, Daily, Nita Farmer MD, 1 tablet at 07/30/24 0919    mupirocin (BACTROBAN) 2 % ointment 1 Application, 1 Application, Topical, Q12H, Nita Farmer MD, 1 Application at 07/30/24 0920    Pharmacy to dose vancomycin, , Does not  apply, Q12H, Nita Farmer MD, Given at 07/30/24 0900    polyethylene glycol (MIRALAX) packet 17 g, 17 g, Oral, Daily, Nita Farmer MD, 17 g at 07/29/24 1018    rosuvastatin (CRESTOR) tablet 10 mg, 10 mg, Oral, Nightly, Nita Farmer MD, 10 mg at 07/28/24 2120    Insert Peripheral IV, , , Once **AND** sodium chloride 0.9 % flush 10 mL, 10 mL, Intravenous, PRN, ShantesorLakshmi, APRN, 10 mL at 07/30/24 0920    traMADol (ULTRAM) tablet 100 mg, 100 mg, Oral, TID, Nita Farmer MD, 100 mg at 07/30/24 0919    Vancomycin HCl 1,250 mg in sodium chloride 0.9 % 250 mL VTB, 1,250 mg, Intravenous, Q12H, Jessica Gustafson MD, Last Rate: 200 mL/hr at 07/30/24 0918, 1,250 mg at 07/30/24 0918    Allergies:   No Known Allergies    Social History:   Social History     Socioeconomic History    Marital status: Single   Tobacco Use    Smoking status: Former     Types: Cigarettes    Smokeless tobacco: Never    Tobacco comments:     when was teen   Vaping Use    Vaping status: Never Used   Substance and Sexual Activity    Alcohol use: No    Drug use: No    Sexual activity: Defer       Family History:   Family History   Problem Relation Age of Onset    Heart disease Mother        Review of Systems:  Constitutional: denies any fever or chills  Musculoskeletal: denies weakness  Neurologic: denies weakness or numbness  Skin: Positive right forearm redness, rash, nonhealing skin lesions     Physical Exam:   Vitals:    07/30/24 1210   BP: 110/69   Pulse: 63   Resp: 16   Temp: 98 °F (36.7 °C)   SpO2:      GENERAL: Awake, alert, interactive, cooperative, comfortable appearing sitting up in bed  HEENT: no scleral icterus; moist mucous membranes  NECK: Supple  RESPIRATORY: normal work of breathing on room air  CARDIOVASCULAR: Regular rate, strong palpable right radial pulse   MUSCULOSKELETAL: no cyanosis; right BKA  NEUROLOGIC: alert and oriented, normal speech, no gross focal deficits   SKIN: Right forearm with swelling and ready induration and several  crusted skin lesions without drainage noted on the dorsum    Diagnostic workup:     Pertinent labs:   Results from last 7 days   Lab Units 07/28/24  0518 07/27/24  0115   WBC 10*3/mm3 8.89 9.13   HEMOGLOBIN g/dL 8.8* 8.6*   HEMATOCRIT % 26.5* 26.2*   PLATELETS 10*3/mm3 744* 702*     Results from last 7 days   Lab Units 07/30/24  0758 07/29/24  0748 07/28/24  0518 07/27/24  0115   SODIUM mmol/L 136 136 134* 132*   POTASSIUM mmol/L 3.9 3.9 3.8 3.6   CHLORIDE mmol/L 101 101 101 93*   CO2 mmol/L 25.0 24.0 25.9 24.0   BUN mg/dL 16 15 13 20   CREATININE mg/dL 0.75* 0.80 0.79 0.84   CALCIUM mg/dL 8.9 8.7 8.8 9.1   BILIRUBIN mg/dL  --   --  0.3 0.5   ALK PHOS U/L  --   --  116 116   ALT (SGPT) U/L  --   --  42* 52*   AST (SGOT) U/L  --   --  42* 52*   GLUCOSE mg/dL 91 91 99 97   MRSA PCR positive  Blood cultures negative at 24 hours      Imaging:  Ultrasound right upper extremity 7/9/2024 images and report reviewed, demonstrates subcutaneous fat with edema consistent with cellulitis, no abscess noted    MRI of the right forearm 7/28/2024 images and report reviewed, demonstrates diffuse soft tissue edema, possible cellulitis or myositis, without well-formed or drainable fluid collection suggestive of abscess    Assessment and plan:   The patient is a 74 y.o. male with cellulitis and induration of the right forearm of unclear etiology    Agree with recommendation for punch biopsy for tissue pathology and culture.  Discussed procedure, risk, benefits, alternatives, and postprocedure expectations.  After informed consent was obtained, the patient agrees to proceed with biopsy.    Valentina Cooney M.D.  General, Robotic, and Endoscopic Surgery  [facility]    [address], [address]  [place], [place], [address]  P: [phone]  F: [phone]

## 2024-07-31 NOTE — CASE MANAGEMENT/SOCIAL WORK
Case Management Discharge Note      Final Note: Pt discharged home on 7/30.   MARC Wills RN         Selected Continued Care - Discharged on 7/30/2024 Admission date: 7/27/2024 - Discharge disposition: Home or Self Care      Destination    No services have been selected for the patient.                Durable Medical Equipment    No services have been selected for the patient.                Dialysis/Infusion    No services have been selected for the patient.                Home Medical Care    No services have been selected for the patient.                Therapy    No services have been selected for the patient.                Community Resources    No services have been selected for the patient.                Community & DME    No services have been selected for the patient.                    Transportation Services  Private: Car    Final Discharge Disposition Code: 01 - home or self-care

## 2024-07-31 NOTE — PLAN OF CARE
Goal Outcome Evaluation:      Pt Alert and oriented. VSS. On RA. NSR on tele. Pt has discharge order for tonight. This RN went over discharge summary and educated pt about cellulitis and new med zyvox. IV vanc is infusing at this time; once it is completed pt is good to go home.

## 2024-07-31 NOTE — PLAN OF CARE
Goal Outcome Evaluation:  Plan of Care Reviewed With: patient        Progress: no change  Outcome Evaluation: Up et about in room using r leg prosthesis, no c/o pain but is on schedule pain medications for RA. Skin biopsy to rt forearm done per Dr. Cooney, pt shahana  well, specimens sent to micro and pathology. R arm with gauze and wrapped in kerlix. nad. sr on tele, room air all day. Dr. Farmer office contacted because pt wants to go home, per DR. Farmer's office , Dr. Farmer will come to floor to see pt.

## 2024-07-31 NOTE — PROGRESS NOTES
"  Infectious Diseases Progress Note    Jessica Gustafson MD     [facility]  [place]: 2 days  Patient Identification:  Name: Latrell Canela  Age: 74 y.o.  Sex: male  :  1950  MRN: [id number]         Primary Care Physician: Nita Farmer MD        Subjective: Doing well denies any specific complaints.  Interval History: See consultation note.  Seen by general surgery service for punch biopsies on 2020  Status post punch biopsy of the right forearm with specimen sent for histopathology as well as microbiology as documented in operative note.  Objective:    Scheduled Meds:aspirin, 81 mg, Oral, Daily  ferrous sulfate, 325 mg, Oral, Daily With Breakfast  folic acid, 1 mg, Oral, Daily  gabapentin, 100 mg, Oral, TID  HYDROcodone-acetaminophen, 1 tablet, Oral, Nightly  [START ON 2024] linezolid, 600 mg, Oral, Q12H  lisinopril (PRINIVIL,ZESTRIL) 20 mg, hydroCHLOROthiazide 12.5 mg for ZESTORETIC 20-12.5, , Oral, Q24H  multivitamin, 1 tablet, Oral, Daily  mupirocin, 1 Application, Topical, Q12H  Pharmacy to dose vancomycin, , Does not apply, Q12H  polyethylene glycol, 17 g, Oral, Daily  rosuvastatin, 10 mg, Oral, Nightly  traMADol, 100 mg, Oral, TID  vancomycin, 1,250 mg, Intravenous, Q12H      Continuous Infusions:     Vital signs in last 24 hours:  Temp:  [97.7 °F (36.5 °C)-98 °F (36.7 °C)] 98 °F (36.7 °C)  Heart Rate:  [63-77] 63  Resp:  [16] 16  BP: (110-133)/(65-82) 110/69    Intake/Output:    Intake/Output Summary (Last 24 hours) at 2024  Last data filed at 2024 0918  Gross per 24 hour   Intake 250 ml   Output --   Net 250 ml       Exam:  /69 (BP Location: Right arm, Patient Position: Lying)   Pulse 63   Temp 98 °F (36.7 °C) (Oral)   Resp 16   Ht 190.5 cm (75\")   Wt 88.7 kg (195 lb 8.8 oz)   SpO2 92%   BMI 24.44 kg/m²   Patient is examined using the personal protective equipment as per guidelines from infection control for this particular patient as enacted.  Hand washing " was performed before and after patient interaction.  General Appearance:  Alert and cooperative.                          Head:    Normocephalic, without obvious abnormality, atraumatic                           Eyes:    PERRL, conjunctivae/corneas clear, EOM's intact, both eyes                         Throat:   Lips, tongue, gums normal; oral mucosa pink and moist                           Neck:   Supple, symmetrical, trachea midline, no JVD                         Lungs:    Clear to auscultation bilaterally, respirations unlabored                 Chest Wall:    No tenderness or deformity                          Heart:    Regular rate and rhythm, S1 and S2 normal, no murmur, no rub                                         or gallop                  Abdomen:   Soft nontender                 Extremities: Induration and erythema and overall appearance of the extensor aspect of the right forearm is slightly improved compared to 24 hours ago                        Pulses:   Pulses palpable in all extremities                            Skin: Lesions on the right forearm drying out.                  Neurologic: Alert and oriented x 3 but gets frustrated easily and showing evidence of some cognitive decline.       Data Review:    I reviewed the patient's new clinical results.  Results from last 7 days   Lab Units 07/28/24  0518 07/27/24  0115   WBC 10*3/mm3 8.89 9.13   HEMOGLOBIN g/dL 8.8* 8.6*   PLATELETS 10*3/mm3 744* 702*     Results from last 7 days   Lab Units 07/30/24  0758 07/29/24  0748 07/28/24  0518 07/27/24  0115   SODIUM mmol/L 136 136 134* 132*   POTASSIUM mmol/L 3.9 3.9 3.8 3.6   CHLORIDE mmol/L 101 101 101 93*   CO2 mmol/L 25.0 24.0 25.9 24.0   BUN mg/dL 16 15 13 20   CREATININE mg/dL 0.75* 0.80 0.79 0.84   CALCIUM mg/dL 8.9 8.7 8.8 9.1   GLUCOSE mg/dL 91 91 99 97     Microbiology Results (last 10 days)       Procedure Component Value - Date/Time    Tissue / Bone Culture - Tissue, Forearm, Right [[id number]]  Collected: 07/30/24 1516    Lab Status: Preliminary result Specimen: Tissue from Forearm, Right Updated: 07/30/24 1753     Gram Stain No WBCs seen      No organisms seen    MRSA Screen, PCR (Inpatient) - Swab, Nares [[id number]]  (Abnormal) Collected: 07/28/24 1011    Lab Status: Final result Specimen: Swab from Nares Updated: 07/28/24 1249     MRSA PCR MRSA Detected    Narrative:      The negative predictive value of this diagnostic test is high and should only be used to consider de-escalating anti-MRSA therapy. A positive result may indicate colonization with MRSA and must be correlated clinically.    Blood Culture - Blood, Arm, Left [[id number]]  (Normal) Collected: 07/28/24 0954    Lab Status: Preliminary result Specimen: Blood from Arm, Left Updated: 07/30/24 1631     Blood Culture No growth at 2 days    Blood Culture - Blood, Arm, Left [[id number]]  (Normal) Collected: 07/28/24 0950    Lab Status: Preliminary result Specimen: Blood from Arm, Left Updated: 07/30/24 1015     Blood Culture No growth at 2 days              Assessment:    Dizziness  1-persistent progressive inflammation of the right forearm despite seemingly appropriate courses of antibiotic therapy consisting of Rocephin followed by oral cephalosporin followed by doxycycline and now currently on vancomycin with MRI of the forearm pending to rule out any fluid collection-this presentation of the right forearm progressive localized inflammation with necrotic areas while on appropriate antibiotic therapy in the setting of immunocompromise status due to underlying rheumatoid arthritis and its treatment is concerning for:             -Atypical infections such as environmental nontuberculous mycobacterial pathogen versus             -Fungal infection according due to local trauma while performing outside activities with inoculation from environmental source             -Vasculitis due to rheumatoid arthritis autoimmune complex versus sarcoidosis versus              -Malignancy.   -Ongoing low-level soft tissue infection with MRSA colonization.  2-recent symptoms of fatigue and increased tiredness and sleeping more than 14 hours with improvement now with dizziness and element of forgetfulness-etiology could very well be underlying systemic illness and a spontaneous recovery versus chronic infection versus metabolic insufficiency due to antibiotic therapy and not eating and hydrating himself.  3-rheumatoid arthritis  4-neuropathy  5-history of back pain with spinal stenosis and radiculopathy  6-history of right BKA  7-anemia and thrombocytosis.  8-MRSA colonization     Recommendations/Discussions:  See my discussion and recommendation on 7/27/2024 and 7/28/2024.  Continue with arm elevation and IV vancomycin  Follow-up on punch biopsy pathology and culture results  Monitor closely for side effects of antibiotic therapy  Will switch him to oral Zyvox to complete 2 weeks of treatment for complicated soft tissue infection with MRSA while awaiting the pathology results once patient is considered ready to be discharged.  Drug interaction with Zyvox in his current regimen needs to be looked at carefully to avoid unintended side effects from the use of Zyvox.  Jessica Gustafson MD  7/30/2024  20:00 EDT    Parts of this note may be an electronic transcription/translation of spoken language to printed text using the Dragon dictation system.

## 2024-08-01 ENCOUNTER — APPOINTMENT (OUTPATIENT)
Dept: WOUND CARE | Facility: HOSPITAL | Age: 74
End: 2024-08-01
Payer: MEDICARE

## 2024-08-01 LAB
DX PRELIMINARY: NORMAL
LAB AP CASE REPORT: NORMAL
LAB AP DIAGNOSIS COMMENT: NORMAL
PATH REPORT.GROSS SPEC: NORMAL

## 2024-08-02 LAB
BACTERIA SPEC AEROBE CULT: NORMAL
GRAM STN SPEC: NORMAL
GRAM STN SPEC: NORMAL

## 2024-08-04 LAB
FUNGUS WND CULT: NORMAL
MYCOBACTERIUM SPEC CULT: NORMAL
NIGHT BLUE STAIN TISS: NORMAL

## 2024-08-05 ENCOUNTER — TELEPHONE (OUTPATIENT)
Dept: SURGERY | Facility: CLINIC | Age: 74
End: 2024-08-05
Payer: MEDICARE

## 2024-08-05 NOTE — TELEPHONE ENCOUNTER
Reviewed patient preliminary pathology results:    Preliminary Diagnosis   1.  Skin, right forearm, punch biopsy: Pending outside consultation               A.  Superficial and deep chronic dermatitis with dermal fibrosis.                 B.  Reactive squamous hyperplasia with hypergranulosis and focal parakeratosis.               C.  No epithelial dysplasia nor malignancy identified.               D.  No fungal nor acid-fast organisms identified by special staining.   Preliminary result electronically signed by Ronald Marion MD on 8/1/2024 at 0807   Comment P   Immunostain for AE 1/3, AFB and GMS are performed utilizing appropriate controls.  No infiltrating cytokeratin positive cells are identified.  Specimen is judged negative for acid-fast and fungal organisms by special staining.  This finding should be correlated with the results of tissue culture if available.  Findings suggest inflammatory process.  Representative sections were shared in consultation with Dr. Arteaga, who concurred.  This case will be sent to [facility] dermatopathology for outside consultation with final diagnosis to follow.     Also reviewed culture data:  No organisms or WBCs seen on culture, with no growth at three days.  No AFBs on smear, no AFB isolated at 1 week.  No fungus isolated at 1 week.    Recommend continued follow up with ID and PCP. Follow up final tissue path.   Patient voiced understanding and is agreeable with recommendations.    Valentina Cooney M.D.  General, Robotic, and Endoscopic Surgery  [facility]    [address], [address]  [place], [place], [address]  P: [phone]  F: [phone]

## 2024-08-06 LAB
FUNGUS WND CULT: NORMAL
MYCOBACTERIUM SPEC CULT: NORMAL
NIGHT BLUE STAIN TISS: NORMAL

## 2024-08-07 ENCOUNTER — TELEPHONE (OUTPATIENT)
Dept: SURGERY | Facility: CLINIC | Age: 74
End: 2024-08-07
Payer: MEDICARE

## 2024-08-07 LAB
DX PRELIMINARY: NORMAL
LAB AP CASE REPORT: NORMAL
LAB AP DIAGNOSIS COMMENT: NORMAL
PATH REPORT.FINAL DX SPEC: NORMAL
PATH REPORT.GROSS SPEC: NORMAL

## 2024-08-07 NOTE — TELEPHONE ENCOUNTER
Called patient to discuss final path results. Left voicemail asking for call back.  I forwarded the path/micro results to Dr. Gustafson to evaluate for further recommendations.    Valentina Cooney M.D.  General, Robotic, and Endoscopic Surgery  [facility]    [address], [address]  [place], [place], [address]  P: [phone]  F: [phone]

## 2024-08-13 LAB
FUNGUS WND CULT: NORMAL
MYCOBACTERIUM SPEC CULT: NORMAL
NIGHT BLUE STAIN TISS: NORMAL

## 2024-08-16 ENCOUNTER — CONSULT (OUTPATIENT)
Dept: ONCOLOGY | Facility: CLINIC | Age: 74
End: 2024-08-16
Payer: MEDICARE

## 2024-08-16 ENCOUNTER — LAB (OUTPATIENT)
Dept: LAB | Facility: HOSPITAL | Age: 74
End: 2024-08-16
Payer: MEDICARE

## 2024-08-16 VITALS
DIASTOLIC BLOOD PRESSURE: 65 MMHG | RESPIRATION RATE: 20 BRPM | BODY MASS INDEX: 22.95 KG/M2 | TEMPERATURE: 97.9 F | SYSTOLIC BLOOD PRESSURE: 107 MMHG | OXYGEN SATURATION: 94 % | HEART RATE: 86 BPM | HEIGHT: 75 IN | WEIGHT: 184.6 LBS

## 2024-08-16 DIAGNOSIS — D64.9 ANEMIA, UNSPECIFIED TYPE: Primary | ICD-10-CM

## 2024-08-16 DIAGNOSIS — D63.8 ANEMIA OF CHRONIC DISEASE: Primary | ICD-10-CM

## 2024-08-16 LAB
BASOPHILS # BLD AUTO: 0.08 10*3/MM3 (ref 0–0.2)
BASOPHILS NFR BLD AUTO: 0.7 % (ref 0–1.5)
DEPRECATED RDW RBC AUTO: 57.4 FL (ref 37–54)
EOSINOPHIL # BLD AUTO: 0.15 10*3/MM3 (ref 0–0.4)
EOSINOPHIL NFR BLD AUTO: 1.4 % (ref 0.3–6.2)
ERYTHROCYTE [DISTWIDTH] IN BLOOD BY AUTOMATED COUNT: 17.6 % (ref 12.3–15.4)
HCT VFR BLD AUTO: 32.3 % (ref 37.5–51)
HGB BLD-MCNC: 10.7 G/DL (ref 13–17.7)
IMM GRANULOCYTES # BLD AUTO: 0.06 10*3/MM3 (ref 0–0.05)
IMM GRANULOCYTES NFR BLD AUTO: 0.6 % (ref 0–0.5)
LYMPHOCYTES # BLD AUTO: 1.73 10*3/MM3 (ref 0.7–3.1)
LYMPHOCYTES NFR BLD AUTO: 15.9 % (ref 19.6–45.3)
MCH RBC QN AUTO: 30.2 PG (ref 26.6–33)
MCHC RBC AUTO-ENTMCNC: 33.1 G/DL (ref 31.5–35.7)
MCV RBC AUTO: 91.2 FL (ref 79–97)
MONOCYTES # BLD AUTO: 0.27 10*3/MM3 (ref 0.1–0.9)
MONOCYTES NFR BLD AUTO: 2.5 % (ref 5–12)
NEUTROPHILS NFR BLD AUTO: 78.9 % (ref 42.7–76)
NEUTROPHILS NFR BLD AUTO: 8.6 10*3/MM3 (ref 1.7–7)
NRBC BLD AUTO-RTO: 0.2 /100 WBC (ref 0–0.2)
PLATELET # BLD AUTO: 330 10*3/MM3 (ref 140–450)
PMV BLD AUTO: 9.2 FL (ref 6–12)
RBC # BLD AUTO: 3.54 10*6/MM3 (ref 4.14–5.8)
WBC NRBC COR # BLD AUTO: 10.89 10*3/MM3 (ref 3.4–10.8)

## 2024-08-16 PROCEDURE — 36415 COLL VENOUS BLD VENIPUNCTURE: CPT

## 2024-08-16 PROCEDURE — 85025 COMPLETE CBC W/AUTO DIFF WBC: CPT

## 2024-08-16 NOTE — PROGRESS NOTES
REASON FOR CONSULTATION: Anemia  Provide an opinion on any further workup or treatment                             REQUESTING PHYSICIAN: Nita Farmer MD       RECORDS OBTAINED:  Records of the patients history including those obtained from the referring provider were reviewed and summarized in detail.    HISTORY OF PRESENT ILLNESS:  The patient is a 74 y.o. year old male who is here for an opinion about the above issue.    History of Present Illness   The patient is a 74-year-old male who we are asked to see for anemia.  He had recently been admitted to [facility] 7/27-30/2024 with nausea vomiting dizziness.  He had developed a right upper extremity cellulitis 3 weeks previous treated with oral and IV antibiotic therapy and developed further necrotic skin areas with redness and induration along with a history of immunosuppression undergoing treatments for rheumatoid arthritis.     Subsequent Therapy with IV antibiotics continued after initial MRI of the right forearm demonstrating diffuse subcutaneous soft tissue edema and edema throughout the soft tissues thought to represent cellulitis and myositis with partially imaged extensor carpi all naris tenosynovitis.  Nasal swab was positive for MRSA which was addressed again with IV antibiotics transition to oral Zyvox.  Additional diagnoses include peripheral polyneuropathy, lower extremity radiculopathy from lumbar spinal stenosis.    Notably has a history of anemia of chronic disease and oral iron for several years and hemoglobin gradually dropped further.  His studies supported this during his hospitalization with hemoglobin hematocrit dropping from 13.2 and 2018-9.4 documented 7/8/2024 and 8.8 -7/28/24.  Further supporting formation include a ferritin of 611 and iron profile with iron of 49, saturation 18, transferrin 187 and TIBC 279 all significantly reduced.  These parameters are consistent with anemia of chronic disease.    The patient is seen 8/16/2024 and  advised of the findings that would be consistent with his ongoing medical issues and, likely improve with addressing his underlying medical issues initially.  Indeed his follow-up CBC 8/16/2024 include an H&H of 10.7 and 32.3 with white count 10,008 or 90 and platelet count of 3 20,000 consistent with significant improvement.    The patient seen in consultation and we have discussed his previous laboratory studies, recent rather debilitating medical condition and, the findings, that he is clinically improving now reflected in his gradual improvement for hemoglobin hematocrit.  He is advised that he is finally recovering from his recent hospitalization and this is reflected in his improved laboratory tests.  As he continues to improve so should his parameters as noted above.    Past Medical History:   Diagnosis Date    Abnormal ECG     Anemia     Hyperlipidemia     Hypertension     Neurogenic claudication due to lumbar spinal stenosis     Neuropathy     LLE    Rheumatoid arthritis         Past Surgical History:   Procedure Laterality Date    AMPUTATION Right     R BKA        Current Outpatient Medications on File Prior to Visit   Medication Sig Dispense Refill    aspirin 81 MG EC tablet Take 1 tablet by mouth Daily.      folic acid (FOLVITE) 1 MG tablet Take  by mouth Daily.  3    HYDROcodone-acetaminophen (NORCO) 5-325 MG per tablet Take 1 tablet by mouth every night at bedtime.      IRON CR PO Take  by mouth.      lisinopril-hydrochlorothiazide (PRINZIDE,ZESTORETIC) 20-12.5 MG per tablet Take 1 tablet by mouth Daily.      methotrexate 2.5 MG tablet TK 6 TS PO 1 TIME A WK WF  5    Multiple Vitamin (MULTI-DAY PO) Take  by mouth.      rosuvastatin (CRESTOR) 10 MG tablet Take 1 tablet by mouth every night at bedtime.      traMADol HCl (ULTRAM) 100 MG tablet Take 1 tablet by mouth 3 times a day.      Xeljanz XR 11 MG tablet sustained-release 24 hour Take 1 tablet by mouth Daily.       No current facility-administered  "medications on file prior to visit.        ALLERGIES:  No Known Allergies     Social History     Socioeconomic History    Marital status: Single   Tobacco Use    Smoking status: Former     Types: Cigarettes    Smokeless tobacco: Never    Tobacco comments:     when was teen   Vaping Use    Vaping status: Never Used   Substance and Sexual Activity    Alcohol use: No    Drug use: No    Sexual activity: Defer        Family History   Problem Relation Age of Onset    Heart disease Mother         Review of Systems   Constitutional:  Positive for activity change and unexpected weight change (15 lbsX 3 weeks). Negative for fever.   HENT: Negative.     Eyes: Negative.    Respiratory:  Positive for shortness of breath. Negative for cough.    Gastrointestinal:  Positive for constipation.   Endocrine: Negative.    Genitourinary: Negative.    Musculoskeletal:  Positive for joint swelling.   Allergic/Immunologic: Negative.    Neurological:  Positive for dizziness.   Hematological: Negative.    Psychiatric/Behavioral: Negative.          Objective     Vitals:    08/16/24 1242   BP: 107/65   Pulse: 86   Resp: 20   Temp: 97.9 °F (36.6 °C)   TempSrc: Oral   SpO2: 94%   Weight: 83.7 kg (184 lb 9.6 oz)   Height: 190.5 cm (75\")   PainSc: 0-No pain          No data to display                Physical Exam  Constitutional:       Appearance: Normal appearance. He is normal weight.   HENT:      Head: Normocephalic and atraumatic.      Nose: Nose normal.      Mouth/Throat:      Mouth: Mucous membranes are moist.      Pharynx: Oropharynx is clear.   Eyes:      Extraocular Movements: Extraocular movements intact.      Conjunctiva/sclera: Conjunctivae normal.      Pupils: Pupils are equal, round, and reactive to light.   Cardiovascular:      Rate and Rhythm: Normal rate.   Pulmonary:      Effort: Pulmonary effort is normal.      Breath sounds: Normal breath sounds.   Abdominal:      General: Bowel sounds are normal.      Palpations: Abdomen is " soft.   Musculoskeletal:      Cervical back: Normal range of motion and neck supple.      Comments: Right BKA with prosthesis   Skin:     General: Skin is warm and dry.   Neurological:      General: No focal deficit present.      Mental Status: He is oriented to person, place, and time.   Psychiatric:         Mood and Affect: Mood normal.         Behavior: Behavior normal.           RECENT LABS:  Hematology WBC   Date Value Ref Range Status   08/16/2024 10.89 (H) 3.40 - 10.80 10*3/mm3 Final     RBC   Date Value Ref Range Status   08/16/2024 3.54 (L) 4.14 - 5.80 10*6/mm3 Final     Hemoglobin   Date Value Ref Range Status   08/16/2024 10.7 (L) 13.0 - 17.7 g/dL Final     Hematocrit   Date Value Ref Range Status   08/16/2024 32.3 (L) 37.5 - 51.0 % Final     Platelets   Date Value Ref Range Status   08/16/2024 330 140 - 450 10*3/mm3 Final          Assessment & Plan       74-year-old male with recent hospitalization related to right upper extremity cellulitis.  He has a known history of rheumatoid arthritis currently on methotrexate.       His record describes a history of anemia chronic disease related to his rheumatologic disorder and when hospitalized with his extensive cellulitis this worsened.     His studies supported this during his hospitalization with hemoglobin hematocrit dropping from 13.2 and 2018-9.4 documented 7/8/2024 and 8.8 -7/28/24.  Further supporting formation include a ferritin of 611 and iron profile with iron of 49, saturation 18, transferrin 187 and TIBC 279 all significantly reduced.  These parameters are consistent with anemia of chronic disease.    The patient is seen 8/16/2024 and advised of the findings that would be consistent with his ongoing medical issues and, likely improve with addressing his underlying medical issues initially.  Indeed his follow-up CBC 8/16/2024 include an H&H of 10.7 and 32.3 with white count 10,008 or 90 and platelet count of 3 20,000 consistent with significant  improvement.    The patient seen in consultation and we have discussed his previous laboratory studies, recent rather debilitating medical condition and, the findings, that he is clinically improving now reflected in his gradual improvement for hemoglobin hematocrit.  He is advised that he is finally recovering from his recent infection, associated hospitalization and this is reflected in his improved performance status as well as laboratory tests.  As he continues to improve so should his hematologic parameters as noted above.    No additional intervention is needed for this patient but follow-up is requested with a repeat CBC in our office in approximately 4 to 8 weeks.

## 2024-08-20 LAB
MYCOBACTERIUM SPEC CULT: NORMAL
NIGHT BLUE STAIN TISS: NORMAL

## 2024-08-25 LAB — FUNGUS WND CULT: NORMAL

## 2024-08-26 ENCOUNTER — TELEPHONE (OUTPATIENT)
Dept: ONCOLOGY | Facility: CLINIC | Age: 74
End: 2024-08-26
Payer: MEDICARE

## 2024-08-26 NOTE — TELEPHONE ENCOUNTER
Caller: Latrell Canela    Relationship to patient: Self    Best call back number: [phone]    Chief complaint: NEED TO RESCHEDULE CONFLICT OF SCHEDULE     Type of visit: LAB AND FOLLOW UP 1    Requested date: NEEDING ON A MONDAY , TUESDAY OR WEDNESDAY AFTER 11AM     If rescheduling, when is the original appointment: 10/11

## 2024-08-27 LAB
MYCOBACTERIUM SPEC CULT: NORMAL
NIGHT BLUE STAIN TISS: NORMAL

## 2024-08-28 ENCOUNTER — TELEPHONE (OUTPATIENT)
Dept: SURGERY | Facility: CLINIC | Age: 74
End: 2024-08-28
Payer: MEDICARE

## 2024-08-28 NOTE — TELEPHONE ENCOUNTER
----- Message from Valentina Cooney sent at 8/28/2024 11:54 AM EDT -----  Regarding: lab result  Could someone please call this patient and inform him that the fungal culture from his right forearm biopsy 7/30/24 was negative? He does not require any antifungal treatment. He should follow up with his PCP and Dr. Gustafson with ID for ongoing evaluation of right forearm cellulitis.    Thank you,  KEY  ----- Message -----  From: Lab, Background User  Sent: 8/1/2024   8:07 AM EDT  To: Valentina Cooney MD

## 2024-08-28 NOTE — TELEPHONE ENCOUNTER
Called patient and LVM with his forearm biopsy results. Advised to call back with any questions and to follow up with his PCP.

## 2024-09-03 LAB
MYCOBACTERIUM SPEC CULT: NORMAL
NIGHT BLUE STAIN TISS: NORMAL

## 2024-09-10 LAB
MYCOBACTERIUM SPEC CULT: NORMAL
NIGHT BLUE STAIN TISS: NORMAL

## 2024-09-25 ENCOUNTER — TELEPHONE (OUTPATIENT)
Dept: ONCOLOGY | Facility: CLINIC | Age: 74
End: 2024-09-25

## 2024-09-25 ENCOUNTER — OFFICE VISIT (OUTPATIENT)
Dept: NEUROLOGY | Facility: CLINIC | Age: 74
End: 2024-09-25
Payer: MEDICARE

## 2024-09-25 VITALS
SYSTOLIC BLOOD PRESSURE: 106 MMHG | WEIGHT: 184 LBS | HEART RATE: 81 BPM | BODY MASS INDEX: 23 KG/M2 | OXYGEN SATURATION: 99 % | DIASTOLIC BLOOD PRESSURE: 68 MMHG

## 2024-09-25 DIAGNOSIS — G62.9 PERIPHERAL POLYNEUROPATHY: Primary | ICD-10-CM

## 2024-09-25 RX ORDER — NALOXEGOL OXALATE 25 MG/1
1 TABLET, FILM COATED ORAL DAILY
COMMUNITY
Start: 2024-09-06

## 2024-09-25 RX ORDER — DULOXETIN HYDROCHLORIDE 60 MG/1
60 CAPSULE, DELAYED RELEASE ORAL DAILY
Qty: 90 CAPSULE | Refills: 0 | Status: SHIPPED | OUTPATIENT
Start: 2024-10-09

## 2024-09-25 RX ORDER — DULOXETIN HYDROCHLORIDE 30 MG/1
30 CAPSULE, DELAYED RELEASE ORAL DAILY
Qty: 14 CAPSULE | Refills: 0 | Status: SHIPPED | OUTPATIENT
Start: 2024-09-25

## 2024-09-25 NOTE — TELEPHONE ENCOUNTER
Caller: Latrell Canela    Relationship: Self    Best call back number: [phone]     What is the best time to reach you: ANYTIME, IF NEEDED    Who are you requesting to speak with (clinical staff, provider,  specific staff member): SCHEDULING        What was the call regarding: PT CALLED TO CANCEL 10/11 APPT.  HUB ADVISED IT WAS ALREADY CANCELED AND HAD BEEN R/S TO 10/21.  HE IS ASKING TO CANCEL THAT APPT AS WELL AND STATES HE WILL CALL BACK IF HE NEEDS TO R/S.

## 2024-11-06 ENCOUNTER — OFFICE VISIT (OUTPATIENT)
Dept: NEUROLOGY | Facility: CLINIC | Age: 74
End: 2024-11-06
Payer: MEDICARE

## 2024-11-06 VITALS
OXYGEN SATURATION: 96 % | BODY MASS INDEX: 23 KG/M2 | HEART RATE: 81 BPM | WEIGHT: 184 LBS | DIASTOLIC BLOOD PRESSURE: 68 MMHG | SYSTOLIC BLOOD PRESSURE: 100 MMHG

## 2024-11-06 DIAGNOSIS — G62.9 PERIPHERAL POLYNEUROPATHY: Primary | ICD-10-CM

## 2024-11-06 RX ORDER — VALACYCLOVIR HYDROCHLORIDE 1 G/1
1 TABLET, FILM COATED ORAL EVERY 12 HOURS SCHEDULED
COMMUNITY
Start: 2024-10-01

## 2024-11-06 RX ORDER — DULOXETIN HYDROCHLORIDE 30 MG/1
CAPSULE, DELAYED RELEASE ORAL
Qty: 7 CAPSULE | Refills: 0 | Status: SHIPPED | OUTPATIENT
Start: 2024-11-06

## 2024-11-06 RX ORDER — PREGABALIN 25 MG/1
CAPSULE ORAL
Qty: 81 CAPSULE | Refills: 0 | Status: SHIPPED | OUTPATIENT
Start: 2024-11-06

## 2024-11-06 NOTE — PROGRESS NOTES
"Date of visit: 11/6/2024   Patient ID: Latrell Canela  Age: 74 y.o.     Chief compliant:   Chief Complaint   Patient presents with    Peripheral Neuropathy         Interval history (11/6/2024):   No significant benefit or worsening on duloxetine which she is tolerating well up to 60 mg daily since her prior appointment 6 weeks ago.  No new symptoms of concern.  Discussed labs which were not completed since last appointment.  He plans to get them today.        Initial HPI (9/25/2024):   \"Latrell Canela is a 74 y.o. male with history of rheumatoid arthritis on methotrexate in 2002, HTN, HLD, neuropathy, lumbar spinal stenosis, right BKA who presents for evaluation of neuropathy.     He particularly reports symptom onset about 8 to 9 months prior.  He is mainly concerned regarding that he is limping more often and needs to massage his legs due to the pain of his left leg.  On review of his records, discussed that he had seen neurology at [facility] in 2022 for neuropathy and had multiple labs evaluated for such that were overall unrevealing.  Additionally he has had an EMG in 2021 with the same symptoms.  He is surprised that it has been this long, but is in agreement it may have been multiple years of the symptoms.  He reports having a right BKA after an injury of his foot that led to difficulty feeling his right foot.  He had severe cellulitis which led to amputation.     He denies that it is particularly worsening. It's better in the morning, worse with activity. If he sits for a while he has a limp on moving.  Pain is particularly outside of his calf.  He does report numbness in his left foot which is not painful.  Denies any back pain radiating pain down his legs. No bowel or bladder dysfunction except that tramadol causes constipation. Urinates often through the night.  Reports his leg is getting weaker. He reports he is vulnerable to falls. No numbness or weakness of the arms.      More recently " "after cellulitis of the arm in 2024, he couldn't really move his fingers. Wrist did not flop. Handgrip is improving.      Regarding symptoms, he takes Tramadol TID which helps a little.  Reports previously being on Norco.  He had previously tried gabapentin but felt that it made him \"loopy\".     Former  with [facility]. No tobacco or smoking \"       Review of Systems   Neurological:  Positive for weakness and numbness.   Psychiatric/Behavioral:  Negative for behavioral problems and confusion.         The following portions of the patient's history were reviewed and updated as appropriate: allergies, current medications, past family history, past medical history, past social history, past surgical history and problem list.    Vitals:    24 1124   BP: 100/68   Pulse: 81   SpO2: 96%     Neurological Exam  Mental Status  Awake, alert and oriented to person, place and time. Speech is normal. Language is fluent with no aphasia. Attention and concentration are normal. Fund of knowledge is appropriate for level of education.    Cranial Nerves  CN II: Visual acuity is normal. Visual fields full to confrontation.  CN III, IV, VI: Extraocular movements intact bilaterally. Pupils equal round and reactive to light bilaterally.  CN V: Facial sensation is normal.  CN VII: Full and symmetric facial movement.  CN XI: Shoulder shrug strength is normal.    Motor  Decreased muscle bulk throughout. Decreased bulk of all intrinsic hand muscles as well as hyperthenar and thenar eminence, atrophy to left lower extremity fairly diffusely in the quadriceps as well as in the soleus/gastrocnemius of the left leg.  s/p BKA right.  Strength: Right wrist flexion: 4/5  Right interossei: 4/5  Left interossei: 4/5  Right quadriceps: 5/5  Left quadriceps: 4/5  Right hamstrin/5  Left hamstrin/5  Left anterior tibial: 4/5  Left posterior tibial: 4/5  Left peroneal: 2/5  Left gastroc: " 2/5  .    Sensory  Sensation: Right arm light touch: normal  Left arm light touch: normal  Left leg light touch: Absent sensation in left lower extremity to mid shin, diminished more proximally circumferentially to the knee before normalizing.  Left leg vibration: Absent to mid shin, diminished at the knee.  Left leg proprioception: Diminished at the toe, appears intact at the foot.  Left leg pinprick: To mid shin, diminished more proximally to the knee, reduced but intact to the knee.  .     Reflexes                                            Right                      Left  Brachioradialis                    1+                         1+  Biceps                                 1+                         1+  Triceps                                1+                         1+  Patellar                                                        0  Achilles                                                        0  Left Plantar: downgoing      Assessment/Plan:    Portions of this assessment have been copied from previous documentation which has been thoroughly reviewed and updated as appropriate.    Latrell Canela is a 74 y.o. male  presenting with signs and symptoms most consistent with sensorimotor peripheral polyneuropathy with involvement of all extremities (status post right BKA).  There does seem to be a strong motor involvement with evidence of more diffuse atrophy predominantly of intrinsic hand muscles and of the calf.  No upper motor neuron signs and significant sensory involvement as well lowers suspicion for ALS.  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  Would consider if more aggressive therapy is warranted from a rheumatologic perspective, although examination changes currently observed are largely irreversible.  Will pursue further diagnostic evaluation to rule out any additional  contributing cause.  Patient reports incomplete treatment with tramadol and has poorly tolerated gabapentin in the past. Duloxetine was of no benefit. Will trial Lyrica at a low dose to hopefully lower side effect risk. Alternative consideration of nortriptyline or neurostimulation if intractable. Pain is less clearly neuropathic in description with muscular cramping pain.       Diagnoses and all orders for this visit:    1. Peripheral polyneuropathy (Primary)  -     pregabalin (Lyrica) 25 MG capsule; Take one capsule at night for three days, then increase to two capsules at night for three days, then take three capsules at night  Dispense: 81 capsule; Refill: 0  -     DULoxetine (Cymbalta) 30 MG capsule; Take one capsule daily for 7 days, then stop  Dispense: 7 capsule; Refill: 0             Niall Bearden MD    I spent 23 minutes caring for this patient on this date of service. This time includes time spent by me in the following activities as necessary: preparing for the visit, reviewing tests, medical records and previous visits, obtaining and/or reviewing a separately obtained history, performing a medically appropriate exam and/or evaluation, counseling and educating the patient, and/or communicating with other healthcare professionals, documenting information in the medical record, independently interpreting results and communicating that information with the patient, and developing a medically appropriate treatment plan with consideration of other conditions, medications, and treatments.

## 2024-11-06 NOTE — PATIENT INSTRUCTIONS
Please take the lower dose of Cymbalta at 30 mg for one week, then STOP    You may begin taking Lyrica at night once you receive this prescription. Please follow the directions on the bottle

## 2024-11-11 LAB
ALBUMIN SERPL ELPH-MCNC: 4 G/DL (ref 2.9–4.4)
ALBUMIN/GLOB SERPL: 1.3 {RATIO} (ref 0.7–1.7)
ALPHA1 GLOB SERPL ELPH-MCNC: 0.2 G/DL (ref 0–0.4)
ALPHA2 GLOB SERPL ELPH-MCNC: 0.7 G/DL (ref 0.4–1)
B-GLOBULIN SERPL ELPH-MCNC: 1 G/DL (ref 0.7–1.3)
C-ANCA TITR SER IF: NORMAL TITER
FOLATE SERPL-MCNC: >20 NG/ML
GAMMA GLOB SERPL ELPH-MCNC: 1 G/DL (ref 0.4–1.8)
GLOBULIN SER CALC-MCNC: 3 G/DL (ref 2.2–3.9)
IGA SERPL-MCNC: 227 MG/DL (ref 61–437)
IGG SERPL-MCNC: 1071 MG/DL (ref 603–1613)
IGM SERPL-MCNC: 48 MG/DL (ref 15–143)
LABORATORY COMMENT REPORT: NORMAL
M PROTEIN SERPL ELPH-MCNC: NORMAL G/DL
METHYLMALONATE SERPL-SCNC: 313 NMOL/L (ref 0–378)
MYELOPEROXIDASE AB SER IA-ACNC: <0.2 UNITS (ref 0–0.9)
P-ANCA ATYPICAL TITR SER IF: NORMAL TITER
P-ANCA TITR SER IF: NORMAL TITER
PROT PATTERN SERPL ELPH-IMP: NORMAL
PROT PATTERN SERPL IFE-IMP: NORMAL
PROT SERPL-MCNC: 7 G/DL (ref 6–8.5)
PROTEINASE3 AB SER IA-ACNC: <0.2 UNITS (ref 0–0.9)
PYRIDOXAL PHOS SERPL-MCNC: 56.6 UG/L (ref 3.4–65.2)
VIT B12 SERPL-MCNC: 776 PG/ML (ref 232–1245)

## 2024-11-12 ENCOUNTER — TELEPHONE (OUTPATIENT)
Dept: NEUROLOGY | Facility: CLINIC | Age: 74
End: 2024-11-12
Payer: MEDICARE

## 2024-11-12 NOTE — TELEPHONE ENCOUNTER
Megha Giang with [facility] pharmacy called regarding patients medications. Megha has tried multiple times to run Dr. Suleiman LEVI and is having issues. Pharmacy is asking could another provider prescribe patients medications since GURMEET is coming back with issues.         Nakul Giang [phone]

## 2024-11-13 ENCOUNTER — TELEPHONE (OUTPATIENT)
Dept: NEUROLOGY | Facility: CLINIC | Age: 74
End: 2024-11-13

## 2024-11-13 NOTE — TELEPHONE ENCOUNTER
Patient called, no answer so voicemail was left.  Message was sent to his primary care provider requesting that Lyrica be provided in the short-term until [facility] issues are resolved with this [facility].

## 2024-11-13 NOTE — TELEPHONE ENCOUNTER
Provider: LALITHA KIDD    Caller: Zelalem Canela    Relationship to Patient: Self    Pharmacy: LISTED    Phone Number: [phone]    Reason for Call: PT IS CALLING REGARDING HIS RX FOR PREGABALIN.  HE HAS BEEN TOLD BY THE PHARMACY THAT THERE IS A PROBLEM WITH THE PROVIDER PRESCRIBING THIS MEDICATION AND HE HAS NOT BEEN ABLE TO GET IT FILLED.      PLEASE REVIEW AND ADVISE     THANK YOU

## 2024-11-18 NOTE — TELEPHONE ENCOUNTER
Caller: Zelalem Canela    Relationship: Self    Best call back number: [phone]    What was the call regarding: PT CALLED REGARDING THE PREGABALIN. PER NAVJOT KIDD RESPONSE WAS RELAYED - HE STATED NOTHING HAS BEEN SENT TO BE FILLED AND ASKED IF SOMEONE COULD CHECK UP WITH HIS PCP REGARDING THIS MEDICATION.     PLEASE REVIEW  THANK YOU

## 2024-11-22 ENCOUNTER — TELEPHONE (OUTPATIENT)
Dept: NEUROLOGY | Facility: CLINIC | Age: 74
End: 2024-11-22
Payer: MEDICARE

## 2024-11-22 NOTE — TELEPHONE ENCOUNTER
Attempted to LVM in regard to provider being out of office. Informed on VM, [employer] message, and mail reminder on the next new appointment set for patient. Scheduled for January 13th at 10:30AM.

## 2024-12-02 ENCOUNTER — TELEPHONE (OUTPATIENT)
Dept: NEUROLOGY | Facility: CLINIC | Age: 74
End: 2024-12-02
Payer: MEDICARE

## 2024-12-02 NOTE — TELEPHONE ENCOUNTER
Spoke with pt in regards with issues of the Pharmacy fulfilling pregablin for patient, needing information if PCP could fulfill medication until issues with Pharmacy is fixed, please advise.     Patient has ran out of medication since last week.

## 2024-12-20 DIAGNOSIS — G62.9 PERIPHERAL POLYNEUROPATHY: ICD-10-CM

## 2024-12-20 RX ORDER — DULOXETIN HYDROCHLORIDE 60 MG/1
60 CAPSULE, DELAYED RELEASE ORAL DAILY
Qty: 90 CAPSULE | Refills: 0 | OUTPATIENT
Start: 2024-12-20

## 2025-01-13 ENCOUNTER — OFFICE VISIT (OUTPATIENT)
Dept: NEUROLOGY | Facility: CLINIC | Age: 75
End: 2025-01-13
Payer: MEDICARE

## 2025-01-13 VITALS
OXYGEN SATURATION: 98 % | HEIGHT: 75 IN | SYSTOLIC BLOOD PRESSURE: 124 MMHG | HEART RATE: 78 BPM | BODY MASS INDEX: 23 KG/M2 | DIASTOLIC BLOOD PRESSURE: 80 MMHG | WEIGHT: 185 LBS

## 2025-01-13 DIAGNOSIS — G62.9 PERIPHERAL POLYNEUROPATHY: Primary | ICD-10-CM

## 2025-01-13 PROCEDURE — 1159F MED LIST DOCD IN RCRD: CPT | Performed by: PSYCHIATRY & NEUROLOGY

## 2025-01-13 PROCEDURE — 1160F RVW MEDS BY RX/DR IN RCRD: CPT | Performed by: PSYCHIATRY & NEUROLOGY

## 2025-01-13 PROCEDURE — 99214 OFFICE O/P EST MOD 30 MIN: CPT | Performed by: PSYCHIATRY & NEUROLOGY

## 2025-01-13 RX ORDER — PREGABALIN 50 MG/1
50 CAPSULE ORAL 2 TIMES DAILY
Qty: 60 CAPSULE | Refills: 2 | Status: SHIPPED | OUTPATIENT
Start: 2025-01-13

## 2025-01-13 NOTE — PROGRESS NOTES
Date of visit: 1/13/2025  Patient ID: Zelalem Canela  Age: 74 y.o.   Chief compliant:   Chief Complaint   Patient presents with    Peripheral Neuropathy       Portions of this assessment have been copied from previous documentation which has been thoroughly reviewed and updated as appropriate.       Diagnoses and all orders for this visit:    1. Peripheral polyneuropathy (Primary)  -     pregabalin (Lyrica) 50 MG capsule; Take 1 capsule by mouth 2 (Two) Times a Day.  Dispense: 60 capsule; Refill: 2         Zelalem Canela is a 74 y.o. male presenting with signs and symptoms most consistent with sensorimotor peripheral polyneuropathy with involvement of all extremities (status post right BKA).  There does seem to be a strong motor involvement with evidence of more diffuse atrophy predominantly of intrinsic hand muscles and of the calf. 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  Would consider if more aggressive therapy is warranted from a rheumatologic perspective, although examination changes currently observed are largely irreversible. Patient reports incomplete treatment with tramadol and has poorly tolerated gabapentin in the past. Duloxetine was of no benefit. Lyrica at a low dose has been beneficial to pain and sleep at night, but not much symptom improvement during the day. It is likely that some component of his pain is more musculoskeletal as opposed to neuropathic.    Plan:  Further increase Lyrica from 50 mg qHS to 50 mg BID  Instructed patient to call for update in 2 weeks for further adjustments    Niall Bearden MD          Interval history (1/13/2025):   He has had improvement in his sleep quality and pain relief at night with evening Lyrica.  He reports continued pain during the day that is fairly disabling.  He does not feel sedated on Lyrica.  Due to issues  "with the pharmacy, his PCP has filled his first 2 prescriptions.    Interval history (11/6/2024):   No significant benefit or worsening on duloxetine which she is tolerating well up to 60 mg daily since her prior appointment 6 weeks ago.  No new symptoms of concern.  Discussed labs which were not completed since last appointment.  He plans to get them today.     Initial HPI (9/25/2024):   \"Latrell Canela is a 74 y.o. male with history of rheumatoid arthritis on methotrexate in 2002, HTN, HLD, neuropathy, lumbar spinal stenosis, right BKA who presents for evaluation of neuropathy.     He particularly reports symptom onset about 8 to 9 months prior.  He is mainly concerned regarding that he is limping more often and needs to massage his legs due to the pain of his left leg.  On review of his records, discussed that he had seen neurology at [facility] in 2022 for neuropathy and had multiple labs evaluated for such that were overall unrevealing.  Additionally he has had an EMG in 2021 with the same symptoms.  He is surprised that it has been this long, but is in agreement it may have been multiple years of the symptoms.  He reports having a right BKA after an injury of his foot that led to difficulty feeling his right foot.  He had severe cellulitis which led to amputation.     He denies that it is particularly worsening. It's better in the morning, worse with activity. If he sits for a while he has a limp on moving.  Pain is particularly outside of his calf.  He does report numbness in his left foot which is not painful.  Denies any back pain radiating pain down his legs. No bowel or bladder dysfunction except that tramadol causes constipation. Urinates often through the night.  Reports his leg is getting weaker. He reports he is vulnerable to falls. No numbness or weakness of the arms.      More recently after cellulitis of the arm in July 2024, he couldn't really move his fingers. Wrist did not flop. " "Handgrip is improving.      Regarding symptoms, he takes Tramadol TID which helps a little.  Reports previously being on Norco.  He had previously tried gabapentin but felt that it made him \"loopy\".     Former  with [employer]. No tobacco or smoking \"         The following portions of the patient's history were reviewed and updated as appropriate: allergies, current medications, past family history, past medical history, past social history, past surgical history and problem list.    Vitals:    25 1024   BP: 124/80   Pulse: 78   SpO2: 98%     Neurological Exam  Mental Status  Awake, alert and oriented to person, place and time. Speech is normal. Language is fluent with no aphasia. Attention and concentration are normal. Fund of knowledge is appropriate for level of education.     Cranial Nerves  CN II: Visual acuity is normal. Visual fields full to confrontation.  CN III, IV, VI: Extraocular movements intact bilaterally. Pupils equal round and reactive to light bilaterally.  CN V: Facial sensation is normal.  CN VII: Full and symmetric facial movement.  CN XI: Shoulder shrug strength is normal.     Motor  Decreased muscle bulk throughout. Decreased bulk of all intrinsic hand muscles as well as hyperthenar and thenar eminence, atrophy to left lower extremity fairly diffusely in the quadriceps as well as in the soleus/gastrocnemius of the left leg.  s/p BKA right.  Strength: Right wrist flexion: 4/5  Right interossei: 4/5  Left interossei: 4/5  Right quadriceps: 5/5  Left quadriceps: 4/5  Right hamstrin/5  Left hamstrin/5  Left anterior tibial: 4/5  Left posterior tibial: 4/5  Left peroneal: 2/5  Left gastroc: 2/5    Gait: wide based, antalgic  "

## 2025-03-01 ENCOUNTER — HOSPITAL ENCOUNTER (EMERGENCY)
Facility: HOSPITAL | Age: 75
Discharge: HOME | End: 2025-03-01
Attending: EMERGENCY MEDICINE
Payer: MEDICARE

## 2025-03-01 VITALS
DIASTOLIC BLOOD PRESSURE: 82 MMHG | TEMPERATURE: 99.3 F | RESPIRATION RATE: 16 BRPM | HEART RATE: 87 BPM | OXYGEN SATURATION: 100 % | SYSTOLIC BLOOD PRESSURE: 124 MMHG

## 2025-03-01 DIAGNOSIS — M06.9 RHEUMATOID ARTHRITIS, INVOLVING UNSPECIFIED SITE, UNSPECIFIED WHETHER RHEUMATOID FACTOR PRESENT: ICD-10-CM

## 2025-03-01 DIAGNOSIS — M25.551 CHRONIC ARTHRALGIAS OF KNEES AND HIPS: Primary | ICD-10-CM

## 2025-03-01 DIAGNOSIS — M25.562 CHRONIC ARTHRALGIAS OF KNEES AND HIPS: Primary | ICD-10-CM

## 2025-03-01 DIAGNOSIS — M48.062 NEUROGENIC CLAUDICATION DUE TO LUMBAR SPINAL STENOSIS: ICD-10-CM

## 2025-03-01 DIAGNOSIS — M25.561 CHRONIC ARTHRALGIAS OF KNEES AND HIPS: Primary | ICD-10-CM

## 2025-03-01 DIAGNOSIS — G89.29 CHRONIC ARTHRALGIAS OF KNEES AND HIPS: Primary | ICD-10-CM

## 2025-03-01 DIAGNOSIS — M25.552 CHRONIC ARTHRALGIAS OF KNEES AND HIPS: Primary | ICD-10-CM

## 2025-03-01 PROCEDURE — 25010000002 METHYLPREDNISOLONE PER 125 MG: Performed by: EMERGENCY MEDICINE

## 2025-03-01 PROCEDURE — 96372 THER/PROPH/DIAG INJ SC/IM: CPT

## 2025-03-01 PROCEDURE — 99283 EMERGENCY DEPT VISIT LOW MDM: CPT

## 2025-03-01 RX ORDER — METHYLPREDNISOLONE SODIUM SUCCINATE 125 MG/2ML
80 INJECTION, POWDER, LYOPHILIZED, FOR SOLUTION INTRAMUSCULAR; INTRAVENOUS ONCE
Status: COMPLETED | OUTPATIENT
Start: 2025-03-01 | End: 2025-03-01

## 2025-03-01 RX ORDER — METHYLPREDNISOLONE 4 MG/1
TABLET ORAL
Qty: 21 EACH | Refills: 0 | Status: SHIPPED | OUTPATIENT
Start: 2025-03-01

## 2025-03-01 RX ADMIN — METHYLPREDNISOLONE SODIUM SUCCINATE 80 MG: 125 INJECTION, POWDER, FOR SOLUTION INTRAMUSCULAR; INTRAVENOUS at 12:41

## 2025-03-01 NOTE — ED PROVIDER NOTES
EMERGENCY DEPARTMENT ENCOUNTER  Room Number:  22/22  PCP: Nita Farmer MD  Independent Historians: Patient, EMS who brought patient      HPI:  Chief Complaint: had concerns including Fall.  Generalized joint pain    A complete HPI/ROS/PMH/PSH/SH/FH are unobtainable due to:   Chronic or social conditions impacting patient care (Social Determinants of Health):       Context: Zelalem Canela is a 75 y.o. male with a medical history of peripheral neuropathy, rheumatoid arthritis who presents to the ED c/o acute generalized joint pain.  Patient has history of severe rheumatoid arthritis.  He was on Xeljanz but had to be taken off that medicine due to cost.  He is taken some new biologic but could not get it prescribed this month and has increasing generalized joint pain, particularly of the lower extremities.  He states he would like me to help with something for joint inflammation until he can get his biologic filled on Wednesday.  Today he was trying to put on his right prosthetic leg and fell off the bed but denies any injury.  Did not hit his head or lose consciousness.  He does not complain of any new pain beyond the generalized joint pain that he has been having which he attributes to his chronic rheumatoid arthritis.  Denies recent illness including fever, chest pain or trouble breathing.      Review of prior external notes (non-ED) -and- Review of prior external test results outside of this encounter:   I reviewed prior medical records including most recent neurology office note.  Patient does have history of peripheral neuropathy and is on Lyrica.  Other chronic conditions include rheumatoid arthritis, hypertension and hyperlipidemia.      Prescription drug monitoring program review: SILVIO reviewed by Jean Paul Meeks MD       PAST MEDICAL HISTORY  Active Ambulatory Problems     Diagnosis Date Noted    Abnormal EKG 01/02/2019    Neurogenic claudication due to lumbar spinal stenosis 04/23/2024    Dizziness  07/27/2024    Anemia of chronic disease 08/16/2024     Resolved Ambulatory Problems     Diagnosis Date Noted    Cellulitis, unspecified cellulitis site 07/09/2024     Past Medical History:   Diagnosis Date    Abnormal ECG     Anemia     Cellulitis     Hyperlipidemia     Hypertension     Neuropathy     Rheumatoid arthritis          PAST SURGICAL HISTORY  Past Surgical History:   Procedure Laterality Date    AMPUTATION Right     R BKA         FAMILY HISTORY  Family History   Problem Relation Age of Onset    Heart disease Mother          SOCIAL HISTORY  Social History     Socioeconomic History    Marital status: Single   Tobacco Use    Smoking status: Former     Types: Cigarettes    Smokeless tobacco: Never    Tobacco comments:     when was teen   Vaping Use    Vaping status: Never Used   Substance and Sexual Activity    Alcohol use: No    Drug use: No    Sexual activity: Defer         ALLERGIES  Patient has no known allergies.      REVIEW OF SYSTEMS  Review of Systems   Constitutional:  Negative for fever.   Respiratory:  Negative for shortness of breath.    Cardiovascular:  Negative for chest pain.   Musculoskeletal:  Positive for arthralgias.   All other systems reviewed and are negative.    Included in HPI  All systems reviewed and negative except for those discussed in HPI.      PHYSICAL EXAM    I have reviewed the triage vital signs and nursing notes.    ED Triage Vitals [03/01/25 1201]   Temp Heart Rate Resp BP SpO2   99.3 °F (37.4 °C) 80 18 125/75 100 %      Temp src Heart Rate Source Patient Position BP Location FiO2 (%)   Oral -- -- -- --       Physical Exam  GENERAL: Alert well-appearing male in no obvious distress appears younger than stated age.  Triage vitals reviewed and are benign including normal temperature, heart rate, blood pressure and O2 saturations  SKIN: Warm, dry  HENT: Normocephalic, atraumatic  EYES: no scleral icterus  CV: regular rhythm, regular rate-no murmur  RESPIRATORY: normal effort,  lungs clear-O2 sats upper 90s room air  ABDOMEN: soft, nontender, nondistended  MUSCULOSKELETAL: Spine no noted bony deformity, good range of motion  Upper extremities-atraumatic, good range of motion  Lower extremities-there is right lower extremity amputation with prosthesis in place.  He does have limited range of motion of multiple joints of the lower extremities felt to be related to chronic arthritis.  NEURO: alert, moves all extremities, follows commands      LAB RESULTS  No results found for this or any previous visit (from the past 24 hours).      RADIOLOGY  No Radiology Exams Resulted Within Past 24 Hours      MEDICATIONS GIVEN IN ER  Medications   methylPREDNISolone sodium succinate (SOLU-Medrol) injection 80 mg (has no administration in time range)         ORDERS PLACED DURING THIS VISIT:  No orders of the defined types were placed in this encounter.        OUTPATIENT MEDICATION MANAGEMENT:  Current Facility-Administered Medications Ordered in Epic   Medication Dose Route Frequency Provider Last Rate Last Admin    methylPREDNISolone sodium succinate (SOLU-Medrol) injection 80 mg  80 mg Intramuscular Once [place] bat[place] MD         Current Outpatient Medications Ordered in Epic   Medication Sig Dispense Refill    aspirin 81 MG EC tablet Take 1 tablet by mouth Daily.      folic acid (FOLVITE) 1 MG tablet Take  by mouth Daily.  3    HYDROcodone-acetaminophen (NORCO) 5-325 MG per tablet Take 1 tablet by mouth every night at bedtime.      IRON CR PO Take  by mouth.      lisinopril-hydrochlorothiazide (PRINZIDE,ZESTORETIC) 20-12.5 MG per tablet Take 1 tablet by mouth Daily.      methotrexate 2.5 MG tablet TK 6 TS PO 1 TIME A WK WF  5    methylPREDNISolone (MEDROL) 4 MG dose pack Take as directed on package instructions. 21 each 0    Movantik 25 MG tablet Take 1 tablet by mouth Daily.      Multiple Vitamin (MULTI-DAY PO) Take  by mouth.      pregabalin (Lyrica) 50 MG capsule Take 1 capsule by mouth 2 (Two)  Times a Day. 60 capsule 2    rosuvastatin (CRESTOR) 10 MG tablet Take 1 tablet by mouth every night at bedtime.      traMADol HCl (ULTRAM) 100 MG tablet Take 1 tablet by mouth 3 times a day.      valACYclovir (VALTREX) 1000 MG tablet Take 1 tablet by mouth Every 12 (Twelve) Hours.           PROCEDURES  Procedures            PROGRESS, DATA ANALYSIS, CONSULTS, AND MEDICAL DECISION MAKING  All labs have been independently interpreted by me.  All radiology studies have been reviewed by me. All EKG's have been independently viewed and interpreted by me.  Discussion below represents my analysis of pertinent findings related to patient's condition, differential diagnosis, treatment plan and final disposition.    Differential diagnosis includes but is not limited to exacerbation of chronic arthritis, peripheral neuropathy.                 AS OF 12:24 EST VITALS:    BP - 125/75  HR - 80  TEMP - 99.3 °F (37.4 °C) (Oral)  O2 SATS - 100%    COMPLEXITY OF CARE  75-year-old male with history of rheumatoid arthritis, spinal stenosis and peripheral neuropathy presents with worsening generalized joint pain in the setting of not receiving his biologic medications for more than 1 month.    On exam I did not see any obvious acute traumatic injury and do not feel that imaging studies are indicated.  He does have worsening arthropathy, likely due to rheumatoid arthritis.  He will be getting his biologic medication on Wednesday.  This point I would like to give him some steroids as a short-term solution.  Will give IM Solu-Medrol per his request as well as a Medrol Dosepak to go home with.    I did ask if he needed further narcotic pain medication and he states that he is doing fine and has enough of his prescribed hydrocodone.      DIAGNOSIS  Final diagnoses:   Chronic arthralgias of knees and hips   Rheumatoid arthritis, involving unspecified site, unspecified whether rheumatoid factor present   Neurogenic claudication due to lumbar  spinal stenosis         DISPOSITION  ED Disposition       ED Disposition   Discharge    Condition   Stable    Comment   --                Please note that portions of this document were completed with a voice recognition program.    Note Disclaimer: At [facility], we believe that sharing information builds trust and better relationships. You are receiving this note because you recently visited [facility]. It is possible you will see health information before a provider has talked with you about it. This kind of information can be easy to misunderstand. To help you fully understand what it means for your health, we urge you to discuss this note with your provider.         Jean Paul Meeks MD  03/01/25 1300

## 2025-03-01 NOTE — ED NOTES
Patient states he was in a sitting position trying to put on his prosthetic when he lost his balance and fell forward. Patient states he has a history of RA and is having increased pain in his LLE.

## 2025-03-01 NOTE — ED NOTES
Pt to Er from home via [facility]. Pt reports he was sitting on the edge of the bed to put his prosthetic leg on when he slipped off the sheets and fell, landing on his bottom. Denies any headstrike, LOC or blood thinners.     Pt also reporting generalized body pain d/t the fact he has been unable to get his medications for his rheumatoid arthritis for the last 4 weeks.

## 2025-04-28 ENCOUNTER — OFFICE VISIT (OUTPATIENT)
Dept: NEUROLOGY | Facility: CLINIC | Age: 75
End: 2025-04-28
Payer: MEDICARE

## 2025-04-28 VITALS
BODY MASS INDEX: 26.36 KG/M2 | SYSTOLIC BLOOD PRESSURE: 134 MMHG | WEIGHT: 212 LBS | OXYGEN SATURATION: 99 % | DIASTOLIC BLOOD PRESSURE: 86 MMHG | HEART RATE: 69 BPM | HEIGHT: 75 IN

## 2025-04-28 DIAGNOSIS — M06.9 RHEUMATOID ARTHRITIS, INVOLVING UNSPECIFIED SITE, UNSPECIFIED WHETHER RHEUMATOID FACTOR PRESENT: ICD-10-CM

## 2025-04-28 DIAGNOSIS — L98.9 LESION OF SKIN OF SCALP: ICD-10-CM

## 2025-04-28 DIAGNOSIS — G62.9 PERIPHERAL POLYNEUROPATHY: Primary | ICD-10-CM

## 2025-04-28 DIAGNOSIS — R21 RASH OF FINGER: ICD-10-CM

## 2025-04-28 RX ORDER — PREGABALIN 75 MG/1
75 CAPSULE ORAL 2 TIMES DAILY
Qty: 28 CAPSULE | Refills: 0 | Status: SHIPPED | OUTPATIENT
Start: 2025-04-28

## 2025-04-28 NOTE — PATIENT INSTRUCTIONS
Give me a call after one week on the higher dose of Lyrica so I can send you in the appropriate new prescription.

## 2025-04-28 NOTE — PROGRESS NOTES
[facility]   Patient ID: Zelalem Canela  Age: 75 y.o.   Chief compliant:   Chief Complaint   Patient presents with    Peripheral polyneuropathy       Portions of this assessment have been copied from previous documentation which has been thoroughly reviewed and updated as appropriate.    Assessment/Plan:    Zelalem Canela is a 75 y.o. male presenting with signs and symptoms most consistent with sensorimotor peripheral polyneuropathy with involvement of all extremities (status post right BKA).  There does seem to be a strong motor involvement with evidence of more diffuse atrophy predominantly of intrinsic hand muscles and of the calf. 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  Would consider if more aggressive therapy is warranted from a rheumatologic perspective, although examination changes currently observed are largely irreversible. Patient reports incomplete treatment with tramadol and has poorly tolerated gabapentin in the past. Duloxetine was of no benefit. Lyrica at a low dose at night has been beneficial to pain and sleep at night, but not much symptom improvement during the day. He has not had side effects to 50 mg BID and is interested in further increase. It is likely that some component of his pain is more musculoskeletal as opposed to neuropathic, so diclofenac to the knee will be offered as well.     He raises concerns for multiple chronic rashes. Rash of his right index finger appears herpetic, but chronic over months so less likely so. He was directed to discuss with his primary care provider, but dermatology referral was placed for both issues in the interim.        Diagnoses and all orders for this visit:    1. Peripheral polyneuropathy (Primary)  -     pregabalin (Lyrica) 75 MG capsule; Take 1 capsule by mouth 2 (Two) Times a Day.  Dispense:  28 capsule; Refill: 0    2. Rheumatoid arthritis, involving unspecified site, unspecified whether rheumatoid factor present    3. Rash of finger  -     Ambulatory Referral to Dermatology    4. Lesion of skin of scalp  -     Ambulatory Referral to Dermatology    Other orders  -     Diclofenac Sodium (VOLTAREN) 1 % gel gel; Apply 4 g topically to the appropriate area as directed 2 (Two) Times a Day As Needed (knee pain). Apply to the knee  Dispense: 100 g; Refill: 2         Plan:  Increase Lyrica from 50 mg twice daily to 75 mg twice daily.  He was instructed to call the office in 1 week to inform us of his response.  If there has been no side effects, will provide prescription for 100 mg twice daily  Dermatology referral  Voltaren gel to the knee as needed      Niall Bearden MD          Interval history (4/28/2025):   Lyrica increased to 50 mg BID at last appointment.  He believes this helped with his pain, but insufficiently so and is interested in further increase.  He feels he is tolerating it well.  He went to the ED in March. He had ran out of his methotrexate for 4 weeks and had severe pain and weakness. He got relief with steroids. He is back on it.   He points out a rash on his left ring finger which has been present for multiple months.  Denies it changing over time or worsening.  He also points out multiple spots on his scalp that he reports have been present for years but wishes to be evaluated for.      Interval history (1/13/2025):   He has had improvement in his sleep quality and pain relief at night with evening Lyrica.  He reports continued pain during the day that is fairly disabling.  He does not feel sedated on Lyrica.    Due to issues with the pharmacy, his PCP has filled his first 2 prescriptions.     Interval history (11/6/2024):   No significant benefit or worsening on duloxetine which he is tolerating well up to 60 mg daily since her prior appointment 6 weeks ago.  No new symptoms of  "concern.  Discussed labs which were not completed since last appointment.  He plans to get them today.     Initial HPI (9/25/2024):   \"Latrell Canela is a 74 y.o. male with history of rheumatoid arthritis on methotrexate in 2002, HTN, HLD, neuropathy, lumbar spinal stenosis, right BKA who presents for evaluation of neuropathy.     He particularly reports symptom onset about 8 to 9 months prior.  He is mainly concerned regarding that he is limping more often and needs to massage his legs due to the pain of his left leg.  On review of his records, discussed that he had seen neurology at [facility] in 2022 for neuropathy and had multiple labs evaluated for such that were overall unrevealing.  Additionally he has had an EMG in 2021 with the same symptoms.  He is surprised that it has been this long, but is in agreement it may have been multiple years of the symptoms.  He reports having a right BKA after an injury of his foot that led to difficulty feeling his right foot.  He had severe cellulitis which led to amputation.     He denies that it is particularly worsening. It's better in the morning, worse with activity. If he sits for a while he has a limp on moving.  Pain is particularly outside of his calf.  He does report numbness in his left foot which is not painful.  Denies any back pain radiating pain down his legs. No bowel or bladder dysfunction except that tramadol causes constipation. Urinates often through the night.  Reports his leg is getting weaker. He reports he is vulnerable to falls. No numbness or weakness of the arms.      More recently after cellulitis of the arm in July 2024, he couldn't really move his fingers. Wrist did not flop. Handgrip is improving.      Regarding symptoms, he takes Tramadol TID which helps a little.  Reports previously being on Norco.  He had previously tried gabapentin but felt that it made him \"loopy\".     [facility] No " "tobacco or smoking \"         Vitals:    04/28/25 1124   BP: 134/86   Pulse: 69   SpO2: 99%       The following portions of the patient's history were reviewed and updated as appropriate: allergies, current medications, past family history, past medical history, past social history, past surgical history and problem list.    Neurological Exam  Mental Status  Awake, alert and oriented to person, place and time. Speech is normal. Language is fluent with no aphasia. Attention and concentration are normal. Fund of knowledge is appropriate for level of education.    Cranial Nerves  CN II: Visual acuity is normal. Visual fields full to confrontation.  CN III, IV, VI: Extraocular movements intact bilaterally. Pupils equal round and reactive to light bilaterally.  CN V: Facial sensation is normal.  CN VII: Full and symmetric facial movement.  CN XI: Shoulder shrug strength is normal.    Motor  Decreased muscle bulk throughout. Decreased bulk of all intrinsic hand muscles as well as hyperthenar and thenar eminence, atrophy to left lower extremity fairly diffusely in the quadriceps as well as in the soleus/gastrocnemius of the left leg.  s/p BKA right.    Gait  Casual gait: Antalgic gait.      I spent 27 minutes caring for this patient on this date of service. This time includes time spent by me in the following activities as necessary: preparing for the visit, reviewing tests, medical records and previous visits, obtaining and/or reviewing a separately obtained history, performing a medically appropriate exam and/or evaluation, counseling and educating the patient, and/or communicating with other healthcare professionals, documenting information in the medical record, independently interpreting results and communicating that information with the patient, and developing a medically appropriate treatment plan with consideration of other conditions, medications, and treatments.  "

## 2025-05-08 DIAGNOSIS — G62.9 PERIPHERAL POLYNEUROPATHY: Primary | ICD-10-CM

## 2025-05-08 RX ORDER — PREGABALIN 100 MG/1
100 CAPSULE ORAL 2 TIMES DAILY
Qty: 60 CAPSULE | Refills: 0 | Status: SHIPPED | OUTPATIENT
Start: 2025-05-08

## 2025-05-08 NOTE — PROGRESS NOTES
Spoke with patient on the phone. He is not having side effects from Lyrica 75 mg BID. Pain severity has not changed with this dose. A new prescription for Lyrica 100 mg BID has been sent in with no refills. Encouraged patient to reach out in 2 weeks to let us know how he is tolerating this dose.

## 2025-05-19 ENCOUNTER — TELEPHONE (OUTPATIENT)
Dept: NEUROLOGY | Facility: CLINIC | Age: 75
End: 2025-05-19

## 2025-05-19 DIAGNOSIS — G62.9 PERIPHERAL POLYNEUROPATHY: ICD-10-CM

## 2025-05-19 RX ORDER — PREGABALIN 100 MG/1
100 CAPSULE ORAL 2 TIMES DAILY
Qty: 60 CAPSULE | Refills: 2 | Status: SHIPPED | OUTPATIENT
Start: 2025-06-08

## 2025-05-19 NOTE — TELEPHONE ENCOUNTER
PATIENT HAS BEEN TAKING pregabalin (Lyrica) 100 MG capsule     HE SAYS IT IS DOING A LITTLE BETTER AND HE IS NOT EXPERIENCING ANY DROWSINESS WITH IT.    HE IS READY FOR A FULL PRESCRIPTION .    HIS PHARMACY IS:    [id number] [address] AT [facility] - [phone] [facility] [phone]

## 2025-07-30 ENCOUNTER — OFFICE VISIT (OUTPATIENT)
Dept: NEUROLOGY | Facility: CLINIC | Age: 75
End: 2025-07-30
Payer: MEDICARE

## 2025-07-30 VITALS
DIASTOLIC BLOOD PRESSURE: 68 MMHG | SYSTOLIC BLOOD PRESSURE: 112 MMHG | BODY MASS INDEX: 23.13 KG/M2 | HEIGHT: 75 IN | HEART RATE: 75 BPM | OXYGEN SATURATION: 98 % | WEIGHT: 186 LBS

## 2025-07-30 DIAGNOSIS — G62.9 PERIPHERAL POLYNEUROPATHY: Primary | ICD-10-CM

## 2025-07-30 DIAGNOSIS — M06.9 RHEUMATOID ARTHRITIS, INVOLVING UNSPECIFIED SITE, UNSPECIFIED WHETHER RHEUMATOID FACTOR PRESENT: ICD-10-CM

## 2025-07-30 RX ORDER — LEVOCETIRIZINE DIHYDROCHLORIDE 5 MG/1
1 TABLET, FILM COATED ORAL DAILY
COMMUNITY
Start: 2025-06-01

## 2025-07-30 RX ORDER — AMLODIPINE BESYLATE 5 MG/1
5 TABLET ORAL
COMMUNITY
Start: 2025-07-10

## 2025-07-30 RX ORDER — TOFACITINIB 11 MG/1
1 TABLET, FILM COATED, EXTENDED RELEASE ORAL DAILY
COMMUNITY
Start: 2025-06-02

## 2025-07-30 RX ORDER — PREGABALIN 100 MG/1
100 CAPSULE ORAL 3 TIMES DAILY
Qty: 90 CAPSULE | Refills: 2 | Status: SHIPPED | OUTPATIENT
Start: 2025-07-30

## 2025-07-30 NOTE — PROGRESS NOTES
[facility]   Patient ID: Zelalem Canela  Age: 75 y.o.   Chief compliant:   Chief Complaint   Patient presents with    Peripheral Neuropathy       Portions of this assessment have been copied from previous documentation which has been thoroughly reviewed and updated as appropriate.    Assessment/Plan:    Zelalem Canela is a 75 y.o. male presenting with signs and symptoms most consistent with sensorimotor peripheral polyneuropathy with involvement of all extremities (status post right BKA).  There does seem to be a strong motor involvement with evidence of more diffuse atrophy predominantly of intrinsic hand muscles and of the calf. 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 Patient reports incomplete treatment with tramadol and has poorly tolerated gabapentin in the past. Duloxetine was of no benefit. He has found benefit to Lyrica.  Will increase to 3 times a day due to breakthrough pain in the midday. It is likely that some component of his pain is more musculoskeletal as opposed to neuropathic. Pain management referral may be considered.         Diagnoses and all orders for this visit:    1. Peripheral polyneuropathy (Primary)  -     pregabalin (Lyrica) 100 MG capsule; Take 1 capsule by mouth 3 (Three) Times a Day.  Dispense: 90 capsule; Refill: 2    2. Rheumatoid arthritis, involving unspecified site, unspecified whether rheumatoid factor present         Plan:  Increase Lyrica from 100 mg twice daily to 3 times daily.    Niall Bearden MD        Subjective     Interval history (7/30/2025):   Lyrica 100 mg BID.  Has found benefit, but pain is worst around 4 PM.  Dermatologist referral- cream on the finger which helped and has this as needed.  Volatern - ineffective. Pain is more bothersome in the calf than knee currently    Interval history (4/28/2025):  "  Lyrica increased to 50 mg BID at last appointment.  He believes this helped with his pain, but insufficiently so and is interested in further increase.  He feels he is tolerating it well.  He went to the ED in March. He had ran out of his methotrexate for 4 weeks and had severe pain and weakness. He got relief with steroids. He is back on it.   He points out a rash on his left ring finger which has been present for multiple months.  Denies it changing over time or worsening.  He also points out multiple spots on his scalp that he reports have been present for years but wishes to be evaluated for.        Interval history (1/13/2025):   He has had improvement in his sleep quality and pain relief at night with evening Lyrica.  He reports continued pain during the day that is fairly disabling.  He does not feel sedated on Lyrica.    Due to issues with the pharmacy, his PCP has filled his first 2 prescriptions.     Interval history (11/6/2024):   No significant benefit or worsening on duloxetine which he is tolerating well up to 60 mg daily since her prior appointment 6 weeks ago.  No new symptoms of concern.  Discussed labs which were not completed since last appointment.  He plans to get them today.     Initial HPI (9/25/2024):   \"Latrell Canela is a 74 y.o. male with history of rheumatoid arthritis on methotrexate in 2002, HTN, HLD, neuropathy, lumbar spinal stenosis, right BKA who presents for evaluation of neuropathy.     He particularly reports symptom onset about 8 to 9 months prior.  He is mainly concerned regarding that he is limping more often and needs to massage his legs due to the pain of his left leg.  On review of his records, discussed that he had seen neurology at [facility] in 2022 for neuropathy and had multiple labs evaluated for such that were overall unrevealing.  Additionally he has had an EMG in 2021 with the same symptoms.  He is surprised that it has been this long, but is in " "agreement it may have been multiple years of the symptoms.  He reports having a right BKA after an injury of his foot that led to difficulty feeling his right foot.  He had severe cellulitis which led to amputation.     He denies that it is particularly worsening. It's better in the morning, worse with activity. If he sits for a while he has a limp on moving.  Pain is particularly outside of his calf.  He does report numbness in his left foot which is not painful.  Denies any back pain radiating pain down his legs. No bowel or bladder dysfunction except that tramadol causes constipation. Urinates often through the night.  Reports his leg is getting weaker. He reports he is vulnerable to falls. No numbness or weakness of the arms.      More recently after cellulitis of the arm in July 2024, he couldn't really move his fingers. Wrist did not flop. Handgrip is improving.      Regarding symptoms, he takes Tramadol TID which helps a little.  Reports previously being on Norco.  He had previously tried gabapentin but felt that it made him \"loopy\".     Former  with [facility]. No tobacco or smoking \"     Objective     Vitals:    07/30/25 1101   BP: 112/68   Pulse: 75   SpO2: 98%       The following portions of the patient's history were reviewed and updated as appropriate: allergies, current medications, past family history, past medical history, past social history, past surgical history and problem list.    Neurological Exam  Mental Status  Awake, alert and oriented to person, place and time. Speech is normal. Language is fluent with no aphasia. Attention and concentration are normal. Fund of knowledge is appropriate for level of education.    Cranial Nerves  CN II: Visual acuity is normal. Visual fields full to confrontation.  CN III, IV, VI: Extraocular movements intact bilaterally. Pupils equal round and reactive to light bilaterally.  CN V: Facial sensation is normal.  CN VII: Full and " symmetric facial movement.  CN XI: Shoulder shrug strength is normal.    Motor  Decreased muscle bulk throughout. Decreased bulk of all intrinsic hand muscles as well as hyperthenar and thenar eminence, atrophy to left lower extremity fairly diffusely in the quadriceps as well as in the soleus/gastrocnemius of the left leg.  s/p BKA right.    Sensory  Light touch abnormality: Pinprick abnormality: Reduced throughout lower extremities to the level of the knee, mildly reduced in the thigh.  Reduced throughout upper extremities to the elbow.. Vibration abnormality: Reduced but intact at the left knee.  Absent distally.     Gait  Casual gait: Antalgic gait.      Physical Exam  Eyes:      Extraocular Movements: Extraocular movements intact.      Pupils: Pupils are equal, round, and reactive to light.   Psychiatric:         Speech: Speech normal.